# Patient Record
Sex: FEMALE | Race: BLACK OR AFRICAN AMERICAN | NOT HISPANIC OR LATINO
[De-identification: names, ages, dates, MRNs, and addresses within clinical notes are randomized per-mention and may not be internally consistent; named-entity substitution may affect disease eponyms.]

---

## 2021-03-14 ENCOUNTER — APPOINTMENT (OUTPATIENT)
Dept: OBGYN | Facility: CLINIC | Age: 46
End: 2021-03-14
Payer: MEDICARE

## 2021-03-14 VITALS
BODY MASS INDEX: 32.65 KG/M2 | TEMPERATURE: 96.3 F | DIASTOLIC BLOOD PRESSURE: 81 MMHG | WEIGHT: 196 LBS | SYSTOLIC BLOOD PRESSURE: 112 MMHG | HEIGHT: 65 IN | HEART RATE: 67 BPM

## 2021-03-14 DIAGNOSIS — Z11.3 ENCOUNTER FOR SCREENING FOR INFECTIONS WITH A PREDOMINANTLY SEXUAL MODE OF TRANSMISSION: ICD-10-CM

## 2021-03-14 DIAGNOSIS — Z32.01 ENCOUNTER FOR PREGNANCY TEST, RESULT POSITIVE: ICD-10-CM

## 2021-03-14 PROBLEM — Z00.00 ENCOUNTER FOR PREVENTIVE HEALTH EXAMINATION: Status: ACTIVE | Noted: 2021-03-14

## 2021-03-14 PROCEDURE — 99072 ADDL SUPL MATRL&STAF TM PHE: CPT

## 2021-03-14 PROCEDURE — 99386 PREV VISIT NEW AGE 40-64: CPT
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
## 2021-03-14 NOTE — COUNSELING
[Nutrition/ Exercise/ Weight Management] : nutrition, exercise, weight management [Body Image] : body image [Vitamins/Supplements] : vitamins/supplements [Breast Self Exam] : breast self exam [Pregnancy Options] : pregnancy options [STD (testing, results, tx)] : STD (testing, results, tx) [Vaccines] : vaccines

## 2021-03-14 NOTE — PLAN
[FreeTextEntry1] : NAY is a 45 year year old  presenting to establish gyn care and for well woman exam. Sexually active, monogamous with 1 male partner. \par \par HCM\par pap/hpv\par mammo after pregnancy resolved\par sti screening\par urine culture for dysuria\par \par +UPT in office\par approx 6 wks by LMP\par bedside sono shows SLIIUP w/ +FHR\par genetic carrier screening today\par \par rto for dating sono to confirm prengnacy and ob labs\par

## 2021-03-15 LAB
ABO + RH PNL BLD: NORMAL
BLD GP AB SCN SERPL QL: NORMAL
C TRACH RRNA SPEC QL NAA+PROBE: NOT DETECTED
HBV SURFACE AG SER QL: NONREACTIVE
HCG SERPL-MCNC: ABNORMAL MIU/ML
HCV AB SER QL: NONREACTIVE
HCV S/CO RATIO: 0.09 S/CO
HIV1+2 AB SPEC QL IA.RAPID: NONREACTIVE
N GONORRHOEA RRNA SPEC QL NAA+PROBE: NOT DETECTED
SOURCE AMPLIFICATION: NORMAL
T PALLIDUM AB SER QL IA: NEGATIVE

## 2021-03-17 LAB — BACTERIA UR CULT: NORMAL

## 2021-03-18 LAB — CYTOLOGY CVX/VAG DOC THIN PREP: ABNORMAL

## 2021-03-23 LAB — HPV HIGH+LOW RISK DNA PNL CVX: NOT DETECTED

## 2021-04-09 ENCOUNTER — NON-APPOINTMENT (OUTPATIENT)
Age: 46
End: 2021-04-09

## 2021-04-09 ENCOUNTER — APPOINTMENT (OUTPATIENT)
Dept: OBGYN | Facility: CLINIC | Age: 46
End: 2021-04-09
Payer: MEDICARE

## 2021-04-09 ENCOUNTER — ASOB RESULT (OUTPATIENT)
Age: 46
End: 2021-04-09

## 2021-04-09 VITALS
WEIGHT: 199 LBS | HEIGHT: 65 IN | DIASTOLIC BLOOD PRESSURE: 83 MMHG | TEMPERATURE: 96.3 F | SYSTOLIC BLOOD PRESSURE: 117 MMHG | HEART RATE: 68 BPM | BODY MASS INDEX: 33.15 KG/M2

## 2021-04-09 VITALS
DIASTOLIC BLOOD PRESSURE: 83 MMHG | WEIGHT: 199 LBS | SYSTOLIC BLOOD PRESSURE: 117 MMHG | HEIGHT: 65 IN | BODY MASS INDEX: 33.15 KG/M2

## 2021-04-09 PROCEDURE — 76817 TRANSVAGINAL US OBSTETRIC: CPT

## 2021-04-09 PROCEDURE — 99072 ADDL SUPL MATRL&STAF TM PHE: CPT

## 2021-04-09 RX ORDER — DOXYLAMINE SUCCINATE AND PYRIDOXINE HYDROCHLORIDE 20; 20 MG/1; MG/1
20-20 TABLET, EXTENDED RELEASE ORAL
Qty: 14 | Refills: 2 | Status: DISCONTINUED | COMMUNITY
Start: 2021-03-14 | End: 2021-04-09

## 2021-04-12 LAB
B19V IGG SER QL IA: 3.3 INDEX
B19V IGG+IGM SER-IMP: NORMAL
B19V IGG+IGM SER-IMP: POSITIVE
B19V IGM FLD-ACNC: 0.18 INDEX
B19V IGM SER-ACNC: NEGATIVE
BASOPHILS # BLD AUTO: 0.03 K/UL
BASOPHILS NFR BLD AUTO: 0.4 %
CMV IGG SERPL QL: 4.3 U/ML
CMV IGG SERPL-IMP: POSITIVE
CMV IGM SERPL QL: <8 AU/ML
CMV IGM SERPL QL: NEGATIVE
EOSINOPHIL # BLD AUTO: 0.07 K/UL
EOSINOPHIL NFR BLD AUTO: 1 %
HCT VFR BLD CALC: 33.6 %
HGB BLD-MCNC: 10.1 G/DL
IMM GRANULOCYTES NFR BLD AUTO: 0.4 %
LEAD BLD-MCNC: <1 UG/DL
LYMPHOCYTES # BLD AUTO: 1.91 K/UL
LYMPHOCYTES NFR BLD AUTO: 27.9 %
MAN DIFF?: NORMAL
MCHC RBC-ENTMCNC: 24.7 PG
MCHC RBC-ENTMCNC: 30.1 GM/DL
MCV RBC AUTO: 82.2 FL
MEV IGG FLD QL IA: 241 AU/ML
MEV IGG+IGM SER-IMP: POSITIVE
MONOCYTES # BLD AUTO: 0.6 K/UL
MONOCYTES NFR BLD AUTO: 8.8 %
NEUTROPHILS # BLD AUTO: 4.2 K/UL
NEUTROPHILS NFR BLD AUTO: 61.5 %
PLATELET # BLD AUTO: 327 K/UL
RBC # BLD: 4.09 M/UL
RBC # FLD: 19.3 %
RUBV IGG FLD-ACNC: 3.4 INDEX
RUBV IGG SER-IMP: POSITIVE
TSH SERPL-ACNC: 2.85 UIU/ML
VZV AB TITR SER: POSITIVE
VZV IGG SER IF-ACNC: 649.3 INDEX
WBC # FLD AUTO: 6.84 K/UL

## 2021-04-13 LAB
ESTIMATED AVERAGE GLUCOSE: 123 MG/DL
HBA1C MFR BLD HPLC: 5.9 %
MEV IGM SER QL: <0.91 ISR

## 2021-04-21 ENCOUNTER — ASOB RESULT (OUTPATIENT)
Age: 46
End: 2021-04-21

## 2021-04-21 ENCOUNTER — APPOINTMENT (OUTPATIENT)
Dept: ANTEPARTUM | Facility: CLINIC | Age: 46
End: 2021-04-21
Payer: MEDICARE

## 2021-04-21 PROCEDURE — 99072 ADDL SUPL MATRL&STAF TM PHE: CPT

## 2021-04-21 PROCEDURE — 76813 OB US NUCHAL MEAS 1 GEST: CPT

## 2021-04-21 PROCEDURE — 36416 COLLJ CAPILLARY BLOOD SPEC: CPT

## 2021-04-26 LAB
1ST TRIMESTER DATA: NORMAL
ADDENDUM DOC: NORMAL
AFP PNL SERPL: NORMAL
AFP SERPL-ACNC: NORMAL
CLINICAL BIOCHEMIST REVIEW: NORMAL
FREE BETA HCG 1ST TRIMESTER: NORMAL
Lab: NORMAL
NASAL BONE: PRESENT
NOTES NTD: NORMAL
NT: NORMAL
PAPP-A SERPL-ACNC: NORMAL
TRISOMY 18/3: NORMAL

## 2021-04-27 ENCOUNTER — NON-APPOINTMENT (OUTPATIENT)
Age: 46
End: 2021-04-27

## 2021-04-28 ENCOUNTER — APPOINTMENT (OUTPATIENT)
Dept: OBGYN | Facility: CLINIC | Age: 46
End: 2021-04-28
Payer: MEDICARE

## 2021-04-28 VITALS
HEIGHT: 65 IN | SYSTOLIC BLOOD PRESSURE: 121 MMHG | WEIGHT: 199 LBS | DIASTOLIC BLOOD PRESSURE: 88 MMHG | BODY MASS INDEX: 33.15 KG/M2

## 2021-04-28 DIAGNOSIS — N89.8 OTHER SPECIFIED NONINFLAMMATORY DISORDERS OF VAGINA: ICD-10-CM

## 2021-04-28 LAB
HCG UR QL: POSITIVE
QUALITY CONTROL: YES

## 2021-04-28 PROCEDURE — 0502F SUBSEQUENT PRENATAL CARE: CPT | Mod: 25

## 2021-04-28 PROCEDURE — 99213 OFFICE O/P EST LOW 20 MIN: CPT

## 2021-04-28 RX ORDER — ELASTIC BANDAGE 2"X2.2YD
BANDAGE TOPICAL
Refills: 0 | Status: ACTIVE | COMMUNITY

## 2021-04-30 ENCOUNTER — NON-APPOINTMENT (OUTPATIENT)
Age: 46
End: 2021-04-30

## 2021-05-03 DIAGNOSIS — B96.89 ACUTE VAGINITIS: ICD-10-CM

## 2021-05-03 DIAGNOSIS — R73.09 OTHER ABNORMAL GLUCOSE: ICD-10-CM

## 2021-05-03 DIAGNOSIS — N76.0 ACUTE VAGINITIS: ICD-10-CM

## 2021-05-03 LAB
CANDIDA VAG CYTO: NOT DETECTED
G VAGINALIS+PREV SP MTYP VAG QL MICRO: DETECTED
GLUCOSE 1H P 50 G GLC PO SERPL-MCNC: 151 MG/DL
T VAGINALIS VAG QL WET PREP: NOT DETECTED

## 2021-05-10 ENCOUNTER — NON-APPOINTMENT (OUTPATIENT)
Age: 46
End: 2021-05-10

## 2021-05-10 ENCOUNTER — APPOINTMENT (OUTPATIENT)
Dept: OBGYN | Facility: CLINIC | Age: 46
End: 2021-05-10
Payer: MEDICARE

## 2021-05-10 ENCOUNTER — LABORATORY RESULT (OUTPATIENT)
Age: 46
End: 2021-05-10

## 2021-05-10 VITALS
HEIGHT: 65 IN | BODY MASS INDEX: 33.82 KG/M2 | SYSTOLIC BLOOD PRESSURE: 120 MMHG | WEIGHT: 203 LBS | DIASTOLIC BLOOD PRESSURE: 80 MMHG

## 2021-05-10 LAB
GLUCOSE 1H P 100 G GLC PO SERPL-MCNC: 171 MG/DL
GLUCOSE 2H P CHAL SERPL-MCNC: 200 MG/DL
GLUCOSE 3H P CHAL SERPL-MCNC: 203 MG/DL
GLUCOSE BS SERPL-MCNC: 83 MG/DL

## 2021-05-10 PROCEDURE — 0502F SUBSEQUENT PRENATAL CARE: CPT

## 2021-05-12 ENCOUNTER — INPATIENT (INPATIENT)
Facility: HOSPITAL | Age: 46
LOS: 2 days | Discharge: HOME | End: 2021-05-15
Attending: STUDENT IN AN ORGANIZED HEALTH CARE EDUCATION/TRAINING PROGRAM | Admitting: STUDENT IN AN ORGANIZED HEALTH CARE EDUCATION/TRAINING PROGRAM
Payer: MEDICARE

## 2021-05-12 ENCOUNTER — NON-APPOINTMENT (OUTPATIENT)
Age: 46
End: 2021-05-12

## 2021-05-12 VITALS
OXYGEN SATURATION: 100 % | HEART RATE: 84 BPM | DIASTOLIC BLOOD PRESSURE: 75 MMHG | RESPIRATION RATE: 16 BRPM | TEMPERATURE: 99 F | SYSTOLIC BLOOD PRESSURE: 130 MMHG

## 2021-05-12 NOTE — ED ADULT TRIAGE NOTE - CHIEF COMPLAINT QUOTE
Pt arrives from L&D,  approx 15 weeks pregnant c/o palpitations with dizziness, chills since 2:30pm. Reports symptoms has subsided. Denies chest pain. Pt c/o abd cramping and lower back pain. Denies SOB, cough. EKG in progress.

## 2021-05-13 DIAGNOSIS — N12 TUBULO-INTERSTITIAL NEPHRITIS, NOT SPECIFIED AS ACUTE OR CHRONIC: ICD-10-CM

## 2021-05-13 LAB
ALBUMIN SERPL ELPH-MCNC: 3.9 G/DL — SIGNIFICANT CHANGE UP (ref 3.3–5)
ALP SERPL-CCNC: 63 U/L — SIGNIFICANT CHANGE UP (ref 40–120)
ALT FLD-CCNC: 12 U/L — SIGNIFICANT CHANGE UP (ref 4–33)
ANION GAP SERPL CALC-SCNC: 11 MMOL/L — SIGNIFICANT CHANGE UP (ref 7–14)
APPEARANCE UR: CLEAR — SIGNIFICANT CHANGE UP
AST SERPL-CCNC: 20 U/L — SIGNIFICANT CHANGE UP (ref 4–32)
BACTERIA # UR AUTO: NEGATIVE — SIGNIFICANT CHANGE UP
BASOPHILS # BLD AUTO: 0.03 K/UL — SIGNIFICANT CHANGE UP (ref 0–0.2)
BASOPHILS NFR BLD AUTO: 0.3 % — SIGNIFICANT CHANGE UP (ref 0–2)
BILIRUB SERPL-MCNC: 0.2 MG/DL — SIGNIFICANT CHANGE UP (ref 0.2–1.2)
BILIRUB UR-MCNC: NEGATIVE — SIGNIFICANT CHANGE UP
BLD GP AB SCN SERPL QL: NEGATIVE — SIGNIFICANT CHANGE UP
BUN SERPL-MCNC: 13 MG/DL — SIGNIFICANT CHANGE UP (ref 7–23)
CALCIUM SERPL-MCNC: 10.2 MG/DL — SIGNIFICANT CHANGE UP (ref 8.4–10.5)
CHLORIDE SERPL-SCNC: 103 MMOL/L — SIGNIFICANT CHANGE UP (ref 98–107)
CO2 SERPL-SCNC: 19 MMOL/L — LOW (ref 22–31)
COLOR SPEC: YELLOW — SIGNIFICANT CHANGE UP
CREAT SERPL-MCNC: 0.59 MG/DL — SIGNIFICANT CHANGE UP (ref 0.5–1.3)
CULTURE RESULTS: SIGNIFICANT CHANGE UP
DIFF PNL FLD: NEGATIVE — SIGNIFICANT CHANGE UP
EOSINOPHIL # BLD AUTO: 0.07 K/UL — SIGNIFICANT CHANGE UP (ref 0–0.5)
EOSINOPHIL NFR BLD AUTO: 0.7 % — SIGNIFICANT CHANGE UP (ref 0–6)
GLUCOSE SERPL-MCNC: 88 MG/DL — SIGNIFICANT CHANGE UP (ref 70–99)
GLUCOSE UR QL: NEGATIVE — SIGNIFICANT CHANGE UP
HCT VFR BLD CALC: 33 % — LOW (ref 34.5–45)
HGB BLD-MCNC: 10.4 G/DL — LOW (ref 11.5–15.5)
IANC: 7.22 K/UL — SIGNIFICANT CHANGE UP (ref 1.5–8.5)
IMM GRANULOCYTES NFR BLD AUTO: 1.2 % — SIGNIFICANT CHANGE UP (ref 0–1.5)
KETONES UR-MCNC: ABNORMAL
LEUKOCYTE ESTERASE UR-ACNC: NEGATIVE — SIGNIFICANT CHANGE UP
LYMPHOCYTES # BLD AUTO: 19.8 % — SIGNIFICANT CHANGE UP (ref 13–44)
LYMPHOCYTES # BLD AUTO: 2.01 K/UL — SIGNIFICANT CHANGE UP (ref 1–3.3)
MCHC RBC-ENTMCNC: 26.3 PG — LOW (ref 27–34)
MCHC RBC-ENTMCNC: 31.5 GM/DL — LOW (ref 32–36)
MCV RBC AUTO: 83.3 FL — SIGNIFICANT CHANGE UP (ref 80–100)
MONOCYTES # BLD AUTO: 0.7 K/UL — SIGNIFICANT CHANGE UP (ref 0–0.9)
MONOCYTES NFR BLD AUTO: 6.9 % — SIGNIFICANT CHANGE UP (ref 2–14)
NEUTROPHILS # BLD AUTO: 7.22 K/UL — SIGNIFICANT CHANGE UP (ref 1.8–7.4)
NEUTROPHILS NFR BLD AUTO: 71.1 % — SIGNIFICANT CHANGE UP (ref 43–77)
NITRITE UR-MCNC: NEGATIVE — SIGNIFICANT CHANGE UP
NRBC # BLD: 0 /100 WBCS — SIGNIFICANT CHANGE UP
NRBC # FLD: 0 K/UL — SIGNIFICANT CHANGE UP
PH UR: 6.5 — SIGNIFICANT CHANGE UP (ref 5–8)
PLATELET # BLD AUTO: 326 K/UL — SIGNIFICANT CHANGE UP (ref 150–400)
POTASSIUM SERPL-MCNC: 3.4 MMOL/L — LOW (ref 3.5–5.3)
POTASSIUM SERPL-SCNC: 3.4 MMOL/L — LOW (ref 3.5–5.3)
PROT SERPL-MCNC: 7.1 G/DL — SIGNIFICANT CHANGE UP (ref 6–8.3)
PROT UR-MCNC: NEGATIVE — SIGNIFICANT CHANGE UP
RBC # BLD: 3.96 M/UL — SIGNIFICANT CHANGE UP (ref 3.8–5.2)
RBC # FLD: 19.1 % — HIGH (ref 10.3–14.5)
RBC CASTS # UR COMP ASSIST: 2 /HPF — SIGNIFICANT CHANGE UP (ref 0–4)
RH IG SCN BLD-IMP: POSITIVE — SIGNIFICANT CHANGE UP
RH IG SCN BLD-IMP: POSITIVE — SIGNIFICANT CHANGE UP
SARS-COV-2 RNA SPEC QL NAA+PROBE: SIGNIFICANT CHANGE UP
SODIUM SERPL-SCNC: 133 MMOL/L — LOW (ref 135–145)
SP GR SPEC: 1.02 — SIGNIFICANT CHANGE UP (ref 1.01–1.02)
SPECIMEN SOURCE: SIGNIFICANT CHANGE UP
TSH SERPL-MCNC: 3.43 UIU/ML — SIGNIFICANT CHANGE UP (ref 0.27–4.2)
UROBILINOGEN FLD QL: SIGNIFICANT CHANGE UP
WBC # BLD: 10.15 K/UL — SIGNIFICANT CHANGE UP (ref 3.8–10.5)
WBC # FLD AUTO: 10.15 K/UL — SIGNIFICANT CHANGE UP (ref 3.8–10.5)
WBC UR QL: 2 /HPF — SIGNIFICANT CHANGE UP (ref 0–5)

## 2021-05-13 PROCEDURE — 76770 US EXAM ABDO BACK WALL COMP: CPT | Mod: 26

## 2021-05-13 PROCEDURE — 76805 OB US >/= 14 WKS SNGL FETUS: CPT | Mod: 26

## 2021-05-13 PROCEDURE — 99222 1ST HOSP IP/OBS MODERATE 55: CPT | Mod: GC

## 2021-05-13 RX ORDER — ACETAMINOPHEN 500 MG
650 TABLET ORAL ONCE
Refills: 0 | Status: COMPLETED | OUTPATIENT
Start: 2021-05-13 | End: 2021-05-13

## 2021-05-13 RX ORDER — FOLIC ACID 0.8 MG
1 TABLET ORAL DAILY
Refills: 0 | Status: DISCONTINUED | OUTPATIENT
Start: 2021-05-13 | End: 2021-05-14

## 2021-05-13 RX ORDER — HEPARIN SODIUM 5000 [USP'U]/ML
5000 INJECTION INTRAVENOUS; SUBCUTANEOUS EVERY 12 HOURS
Refills: 0 | Status: DISCONTINUED | OUTPATIENT
Start: 2021-05-13 | End: 2021-05-15

## 2021-05-13 RX ORDER — CEFTRIAXONE 500 MG/1
1000 INJECTION, POWDER, FOR SOLUTION INTRAMUSCULAR; INTRAVENOUS EVERY 24 HOURS
Refills: 0 | Status: DISCONTINUED | OUTPATIENT
Start: 2021-05-14 | End: 2021-05-14

## 2021-05-13 RX ORDER — ONDANSETRON 8 MG/1
4 TABLET, FILM COATED ORAL ONCE
Refills: 0 | Status: COMPLETED | OUTPATIENT
Start: 2021-05-13 | End: 2021-05-13

## 2021-05-13 RX ORDER — ACETAMINOPHEN 500 MG
975 TABLET ORAL EVERY 6 HOURS
Refills: 0 | Status: DISCONTINUED | OUTPATIENT
Start: 2021-05-13 | End: 2021-05-15

## 2021-05-13 RX ORDER — SODIUM CHLORIDE 9 MG/ML
2000 INJECTION INTRAMUSCULAR; INTRAVENOUS; SUBCUTANEOUS ONCE
Refills: 0 | Status: COMPLETED | OUTPATIENT
Start: 2021-05-13 | End: 2021-05-13

## 2021-05-13 RX ORDER — SODIUM CHLORIDE 9 MG/ML
1000 INJECTION, SOLUTION INTRAVENOUS
Refills: 0 | Status: COMPLETED | OUTPATIENT
Start: 2021-05-13 | End: 2021-05-13

## 2021-05-13 RX ORDER — ACETAMINOPHEN 500 MG
1000 TABLET ORAL ONCE
Refills: 0 | Status: COMPLETED | OUTPATIENT
Start: 2021-05-13 | End: 2021-05-13

## 2021-05-13 RX ORDER — CEFTRIAXONE 500 MG/1
1000 INJECTION, POWDER, FOR SOLUTION INTRAMUSCULAR; INTRAVENOUS ONCE
Refills: 0 | Status: COMPLETED | OUTPATIENT
Start: 2021-05-13 | End: 2021-05-13

## 2021-05-13 RX ADMIN — SODIUM CHLORIDE 125 MILLILITER(S): 9 INJECTION, SOLUTION INTRAVENOUS at 12:46

## 2021-05-13 RX ADMIN — SODIUM CHLORIDE 2000 MILLILITER(S): 9 INJECTION INTRAMUSCULAR; INTRAVENOUS; SUBCUTANEOUS at 01:01

## 2021-05-13 RX ADMIN — ONDANSETRON 4 MILLIGRAM(S): 8 TABLET, FILM COATED ORAL at 05:53

## 2021-05-13 RX ADMIN — Medication 1 MILLIGRAM(S): at 12:46

## 2021-05-13 RX ADMIN — Medication 400 MILLIGRAM(S): at 07:02

## 2021-05-13 RX ADMIN — Medication 650 MILLIGRAM(S): at 01:01

## 2021-05-13 RX ADMIN — ONDANSETRON 4 MILLIGRAM(S): 8 TABLET, FILM COATED ORAL at 01:01

## 2021-05-13 RX ADMIN — CEFTRIAXONE 100 MILLIGRAM(S): 500 INJECTION, POWDER, FOR SOLUTION INTRAMUSCULAR; INTRAVENOUS at 00:30

## 2021-05-13 RX ADMIN — ONDANSETRON 4 MILLIGRAM(S): 8 TABLET, FILM COATED ORAL at 19:57

## 2021-05-13 RX ADMIN — HEPARIN SODIUM 5000 UNIT(S): 5000 INJECTION INTRAVENOUS; SUBCUTANEOUS at 17:57

## 2021-05-13 RX ADMIN — Medication 1 TABLET(S): at 12:46

## 2021-05-13 NOTE — H&P ADULT - NSHPLABSRESULTS_GEN_ALL_CORE
LABS:                        10.4   10.15 )-----------( 326      ( 13 May 2021 00:37 )             33.0     05-13    133<L>  |  103  |  13  ----------------------------<  88  3.4<L>   |  19<L>  |  0.59    Ca    10.2      13 May 2021 00:37    TPro  7.1  /  Alb  3.9  /  TBili  0.2  /  DBili  x   /  AST  20  /  ALT  12  /  AlkPhos  63  05-13      Urinalysis Basic - ( 13 May 2021 00:43 )    Color: Yellow / Appearance: Clear / S.023 / pH: x  Gluc: x / Ketone: Small  / Bili: Negative / Urobili: <2 mg/dL   Blood: x / Protein: Negative / Nitrite: Negative   Leuk Esterase: Negative / RBC: 2 /HPF / WBC 2 /HPF   Sq Epi: x / Non Sq Epi: x / Bacteria: Negative        Blood Type: O Positive      RADIOLOGY & ADDITIONAL STUDIES:

## 2021-05-13 NOTE — ED ADULT NURSE NOTE - OBJECTIVE STATEMENT
Patient received to intake room 3, A&OX4, ambulatory, c/o palpitations. Approx 15 weeks pregnant, states she had intermittent palpitations earlier today. Also endorses chills/lower back pain. Denies palpitations at this time. Denies CP/SOB. 18G IV placed to R AC, labs drawn and sent. Medicated as per orders.

## 2021-05-13 NOTE — ED CDU PROVIDER DISPOSITION NOTE - CLINICAL COURSE
Patient clinically w/ pyelonephritis, failed outpatient course of Macrobid, admitted to GYN service for further mgmt.

## 2021-05-13 NOTE — PROGRESS NOTE ADULT - ASSESSMENT
Assessment/Plan: 46y  @16w presenting w/chills and flank pain in setting of abx treatment for UTI     Neuro: PO pain meds   CV: Hemodynamically stable  Pulm: Saturating well on RA. Increase incentive spirometry, out of bed, and ambulation as tolerated.  GI: tolerating PO, Zofran for nausea  : Renal US wnl, plan tbd w/attending  Heme: Increase OOB and ambulation.     EDER Lion, PGY1

## 2021-05-13 NOTE — PROVIDER CONTACT NOTE (OTHER) - ACTION/TREATMENT ORDERED:
Will place order for zofran once sign out is completed.
Will change order as soon as a computer available to modify order

## 2021-05-13 NOTE — ED CDU PROVIDER INITIAL DAY NOTE - PROGRESS NOTE DETAILS
mitzi:   pt pregnant 14 weeks, desired pregnancy, with pos urine culture pan sensitive, given macrobid, but presented feeling unwell with chills and with back pain, started on iv abx, gyn involved.   pt awake, alert, looking uncomfortable.  pt with back pain on the right- muscle spasm? vs cva tenderenss.  lungs are clear. ua in the ed was neg but already was on macrobid for more than a week.  pt likely to need abx for inpatient admission.  awaiting GYN plan on this.  giving heat packs and tylenol as well.

## 2021-05-13 NOTE — PROGRESS NOTE ADULT - SUBJECTIVE AND OBJECTIVE BOX
Gyn Progress Note     Subjective:     Pt is a 47yo  @16w being monitored for presumed pyelonephritis after presenting to the ED w/chills and flank pain while on abx for UTI. Patient seen and examined at bedside. This AM patient complains of 8/10 R flank pain which she says is improved from yesterday; nausea but no vomiting, and chills. Denies dysuria or urgency.     Objective:  T(F): 98.1 (21 @ 05:50), Max: 98.7 (21 @ 22:32)  HR: 69 (21 @ 05:50) (69 - 84)  BP: 121/78 (21 @ 05:50) (105/61 - 130/75)  RR: 18 (21 @ 05:50) (16 - 18)  SpO2: 100% (21 @ 05:50) (100% - 100%)  Wt(kg): --  I&O's Summary    POCT Blood Glucose.: 88 mg/dL (12 May 2021 22:37)      Physical Exam:  Constitutional: NAD, A+O x3  CV: RRR  Lungs: clear to auscultation bilaterally  Abdomen: soft, gravid, nontender  Back: CTA tenderness R side    Labs:                        10.4   10.15 )-----------( 326      ( 13 May 2021 00:37 )             33.0   baso 0.3    eos 0.7    imm gran 1.2    lymph 19.8   mono 6.9    poly 71.1         133<L>  |  103    |  13     ----------------------------<  88     3.4<L>   |  19<L>  |  0.59     Ca    10.2       13 May 2021 00:37    TPro  7.1    /  Alb  3.9    /  TBili  0.2    /  DBili  x      /  AST  20     /  ALT  12     /  AlkPhos  63     05-          Urinalysis Basic - ( 13 May 2021 00:43 )    Color: Yellow / Appearance: Clear / S.023 / pH: x  Gluc: x / Ketone: Small  / Bili: Negative / Urobili: <2 mg/dL   Blood: x / Protein: Negative / Nitrite: Negative   Leuk Esterase: Negative / RBC: 2 /HPF / WBC 2 /HPF   Sq Epi: x / Non Sq Epi: x / Bacteria: Negative

## 2021-05-13 NOTE — H&P ADULT - PROBLEM SELECTOR PLAN 1
-admit to OB due clinical presentation concerning for pyelonephritis  -monitor VS  -continue with IVF LR@125  -daily FH check  -continue IV ceftriaxone 1g 24 hours  -f/u blood and urine Cx  -Renal sono without evidence of stone    Discussed with Dr. Annalisa Dao MD PGY4

## 2021-05-13 NOTE — ED ADULT NURSE NOTE - ED STAT RN HANDOFF DETAILS
Patient is A&Ox4, aware of plan of care, and has room available.  Report faxed to assigned floor with confirmation received.  Patient awaiting transportation.  Will continue to monitor until transport.

## 2021-05-13 NOTE — ED PROVIDER NOTE - PHYSICAL EXAMINATION
Gen: adult F, mild distress  Head: NC/AT  Neck: trachea midline  Resp:  No distress  Ext: no deformities  Neuro:  A&Ox4 appears non focal  Skin:  Warm and dry as visualized  Psych:  Normal affect and mood  Abd:  s/nd, +R>L CVA TTP

## 2021-05-13 NOTE — H&P ADULT - ASSESSMENT
46y  LMP 21 @16.1 wk w/ UCx confirmed pan-sensitive E. Coli UTI () on Macrobid day 9/10 presenting with flank pain and chills at home x1 day.  Labs wnl, WBC 10.15, UA clean.  Patient afebrile with vital signs wnl.  Physical exam w/ CVA tenderness.  Bedside doppler .  Patient received IV Ceftriaxone and IVF in ED as well as tylenol for pain which provided incomplete relief. HPI: 46y  CHRISTO 10/27/21 @16.1wk (determined by 13 wk US inconsistent with LMP) with confirmed pan-sensitive E. Coli UTI () on Macrobid day 9/10 presenting with flank pain and chills at home x1 day.  Labs wnl, WBC 10.15, UA clean.  Patient afebrile with vital signs wnl.  Physical exam w/ CVA tenderness.  Bedside doppler .  Patient received IV Ceftriaxone and IVF in ED as well as tylenol for pain which provided incomplete relief.

## 2021-05-13 NOTE — H&P ADULT - HISTORY OF PRESENT ILLNESS
HPI: 46y  LMP 21 @16wk presented to ED with chills and flank pain at home since 2:30pm.  Patient states Flank pain started at 2:30p, stopped at 5:30p and then restarted at 6pm with chills which prompted her to come to ED.  Associated nausea however no vomiting and tolerating PO.  Patient was on day 9/10 of Macrobid course for pan-sensitive E. Coli UTI diagnosed on UCx .  At that time she had urinary urgency and dysuria which resolved 5 days into antibiotic course.  Denies fever at home, headache, chest pain, SOB, vaginal bleeding, vaginal discharge. Patient initially sent to CDU for renal ultrasound and to monitor for improvement of pain. Pain improved by not resolved.    PNC:  UTI ()    OB/GYN HISTORY:   eTOP x2 (2004 & )  pLTCS 10# ()  known fibroids s/p myomectomy, known ovarian cyst    Name of GYN Physician: Dr. Reis HPI: 46y  CHRISTO 10/27/21 @16.1wk (determined by 13 wk US inconsistent with LMP) presented to ED with chills and flank pain at home since 2:30pm.  Patient states Flank pain started at 2:30p, stopped at 5:30p and then restarted at 6pm with chills which prompted her to come to ED.  Associated nausea however no vomiting and tolerating PO.  Patient was on day 9/10 of Macrobid course for pan-sensitive E. Coli UTI diagnosed on UCx .  At that time she had urinary urgency and dysuria which resolved 5 days into antibiotic course.  Denies fever at home, headache, chest pain, SOB, vaginal bleeding, vaginal discharge. Patient initially sent to CDU for renal ultrasound and to monitor for improvement of pain. Pain improved by not resolved.    PNC:  UTI ()    OB/GYN HISTORY:   eTOP x2 ( & )  pLTCS 10# ()  known fibroids s/p myomectomy, known ovarian cyst    Name of GYN Physician: Dr. Reis

## 2021-05-13 NOTE — ED PROVIDER NOTE - OBJECTIVE STATEMENT
45 yo F, c/o dysuria, flank pain.  Duration: dysuria 1wk.    Context:  15wk pregnant by last US performed 4/22/21.  Known IUP.  Had Pan-sensitive E.Coli UTI identified in clinic, and Tx'd with nitrofurantoin, but still with dysuria.  Now has flank pain & body aches.   Primary OB/GYN:  Dr. Kirill Reis.  UCx performed on 5/1 showed a pan-sensitive E.Coli  Associated Sx:  body aches, bilateral flank pain (R>L).  Reported a single episode of palpitations, now resolved.

## 2021-05-13 NOTE — ED PROVIDER NOTE - CLINICAL SUMMARY MEDICAL DECISION MAKING FREE TEXT BOX
Impression:  H&P c/w pregnant pyelonephritis, failing outpatient PO abx.   Blood type O-positive.  No VB.    Plan:  UA/UCx, BCx, abx, IVF, will d/w OB/GYN

## 2021-05-13 NOTE — ED CDU PROVIDER DISPOSITION NOTE - ATTENDING CONTRIBUTION TO CARE
mitzi:   pt pregnant 14 weeks, desired pregnancy, with pos urine culture pan sensitive, given macrobid, but presented feeling unwell with chills and with back pain, started on iv abx, gyn involved.   pt awake, alert, looking uncomfortable.  pt with back pain on the right- muscle spasm? vs cva tenderenss.  lungs are clear. ua in the ed was neg but already was on macrobid for more than a week.  pt likely to need abx for inpatient admission.  awaiting GYN plan on this.  giving heat packs and tylenol as well.    I performed a history and physical exam of the patient and discussed their management with the resident and /or advanced care provider. I reviewed the resident and /or ACP's note and agree with the documented findings and plan of care. My medical decison making and observations are found above.    gyn plans admission for iv abx and observation

## 2021-05-13 NOTE — ED CDU PROVIDER INITIAL DAY NOTE - OBJECTIVE STATEMENT
CDU note: patient is a 47 y/o F approx 14 weeks pregnant, previously treated with  nitrofurantoin for a UTI, but still with dysuria/flank pain/body aches. In ED labs did not reveal any gross abnormal values. Patient accepted to CDU for pain control and IV abx. CDU note: patient is a 45 y/o F approx 14 weeks pregnant, previously treated with  nitrofurantoin for a UTI, but still with dysuria/flank pain/body aches. In ED labs did not reveal any gross abnormal values. Patient accepted to CDU for pain control and IV abx..

## 2021-05-13 NOTE — ED CDU PROVIDER INITIAL DAY NOTE - ATTENDING CONTRIBUTION TO CARE
MD Oswald:  I have personally performed a face to face diagnostic evaluation on this patient with the PA.  I have reviewed the ACP note and agree with the history, exam, and plan of care, except as noted.  History and Exam by me shows a 45 yo F, c/o dysuria, flank pain.  Sent to CDU for Abx treatment of pyelonephritis.   Duration: dysuria 1wk.    Context:  15wk pregnant by last US performed 4/22/21.  Known IUP.  Had Pan-sensitive E.Coli UTI identified in clinic, and Tx'd with nitrofurantoin, but still with dysuria.  Now has flank pain & body aches.   Primary OB/GYN:  Dr. Kirill Reis.  UCx performed on 5/1 showed a pan-sensitive E.Coli  Associated Sx:  body aches, bilateral flank pain (R>L).  Reported a single episode of palpitations, now resolved.  Impression:  H&P c/w pregnant pyelonephritis, failing outpatient PO abx.   Even though UA/UCx is negative, this is not surprising, given recent PO abx (nitrofurantoin).   Plan:  IV abx in CDU, OB recs, reassess.

## 2021-05-13 NOTE — PROVIDER CONTACT NOTE (OTHER) - ASSESSMENT
Patient feeling nauseous and had small amount of emesis
Patient ordered for Fetal HR to be auscultated 24 hours. Order needs to be modified to state that antepartum nurse has to check every 24 hours since Surgical nurses are not trained to check FHR

## 2021-05-13 NOTE — ED CDU PROVIDER INITIAL DAY NOTE - MEDICAL DECISION MAKING DETAILS
Impression:  H&P c/w pregnant pyelonephritis, failing outpatient PO abx.   Even though UA/UCx is negative, this is not surprising, given recent PO abx (nitrofurantoin).   Plan:  IV abx in CDU, OB recs, reassess.

## 2021-05-13 NOTE — H&P ADULT - NSHPPHYSICALEXAM_GEN_ALL_CORE
Vital Signs Last 24 Hrs  T(C): 36.6 (13 May 2021 10:41), Max: 37.1 (12 May 2021 22:32)  T(F): 97.9 (13 May 2021 10:41), Max: 98.7 (12 May 2021 22:32)  HR: 78 (13 May 2021 10:41) (69 - 84)  BP: 112/68 (13 May 2021 10:41) (105/61 - 130/75)  BP(mean): --  RR: 15 (13 May 2021 10:41) (15 - 18)  SpO2: 100% (13 May 2021 10:41) (100% - 100%)    PHYSICAL EXAM:   Gen: NAD, alert and oriented x 3  Cardiovascular: regular   Respiratory: breathing comfortably on RA  Abd: soft, non tender, non-distended, CVA tenderness R>L  Extremities: NTBL  Skin: warm and well perfused
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
## 2021-05-13 NOTE — ED PROVIDER NOTE - ATTENDING CONTRIBUTION TO CARE
mitzi:   pt pregnant 14 weeks, desired pregnancy, with pos urine culture pan sensitive, given macrobid, but presented feeling unwell with chills and with back pain, started on iv abx, gyn involved.   pt awake, alert, looking uncomfortable.  pt with back pain on the right- muscle spasm? vs cva tenderenss.  lungs are clear. ua in the ed was neg but already was on macrobid for more than a week.  pt likely to need abx for inpatient admission.  awaiting GYN plan on this.  giving heat packs and tylenol as well.    I performed a history and physical exam of the patient and discussed their management with the resident and /or advanced care provider. I reviewed the resident and /or ACP's note and agree with the documented findings and plan of care. My medical decison making and observations are found above.

## 2021-05-14 ENCOUNTER — ASOB RESULT (OUTPATIENT)
Age: 46
End: 2021-05-14

## 2021-05-14 ENCOUNTER — APPOINTMENT (OUTPATIENT)
Dept: MATERNAL FETAL MEDICINE | Facility: CLINIC | Age: 46
End: 2021-05-14
Payer: MEDICARE

## 2021-05-14 DIAGNOSIS — O23.42 UNSPECIFIED INFECTION OF URINARY TRACT IN PREGNANCY, SECOND TRIMESTER: ICD-10-CM

## 2021-05-14 LAB
BASOPHILS # BLD AUTO: 0.03 K/UL — SIGNIFICANT CHANGE UP (ref 0–0.2)
BASOPHILS NFR BLD AUTO: 0.4 % — SIGNIFICANT CHANGE UP (ref 0–2)
EOSINOPHIL # BLD AUTO: 0.07 K/UL — SIGNIFICANT CHANGE UP (ref 0–0.5)
EOSINOPHIL NFR BLD AUTO: 0.8 % — SIGNIFICANT CHANGE UP (ref 0–6)
HCT VFR BLD CALC: 28.9 % — LOW (ref 34.5–45)
HGB BLD-MCNC: 9.2 G/DL — LOW (ref 11.5–15.5)
IANC: 5.68 K/UL — SIGNIFICANT CHANGE UP (ref 1.5–8.5)
IMM GRANULOCYTES NFR BLD AUTO: 1.7 % — HIGH (ref 0–1.5)
LYMPHOCYTES # BLD AUTO: 1.97 K/UL — SIGNIFICANT CHANGE UP (ref 1–3.3)
LYMPHOCYTES # BLD AUTO: 23.4 % — SIGNIFICANT CHANGE UP (ref 13–44)
MCHC RBC-ENTMCNC: 26.7 PG — LOW (ref 27–34)
MCHC RBC-ENTMCNC: 31.8 GM/DL — LOW (ref 32–36)
MCV RBC AUTO: 83.8 FL — SIGNIFICANT CHANGE UP (ref 80–100)
MONOCYTES # BLD AUTO: 0.52 K/UL — SIGNIFICANT CHANGE UP (ref 0–0.9)
MONOCYTES NFR BLD AUTO: 6.2 % — SIGNIFICANT CHANGE UP (ref 2–14)
NEUTROPHILS # BLD AUTO: 5.68 K/UL — SIGNIFICANT CHANGE UP (ref 1.8–7.4)
NEUTROPHILS NFR BLD AUTO: 67.5 % — SIGNIFICANT CHANGE UP (ref 43–77)
NRBC # BLD: 0 /100 WBCS — SIGNIFICANT CHANGE UP
NRBC # FLD: 0 K/UL — SIGNIFICANT CHANGE UP
PLATELET # BLD AUTO: 308 K/UL — SIGNIFICANT CHANGE UP (ref 150–400)
RBC # BLD: 3.45 M/UL — LOW (ref 3.8–5.2)
RBC # FLD: 19.1 % — HIGH (ref 10.3–14.5)
WBC # BLD: 8.41 K/UL — SIGNIFICANT CHANGE UP (ref 3.8–10.5)
WBC # FLD AUTO: 8.41 K/UL — SIGNIFICANT CHANGE UP (ref 3.8–10.5)

## 2021-05-14 PROCEDURE — 72195 MRI PELVIS W/O DYE: CPT | Mod: 26

## 2021-05-14 PROCEDURE — 99232 SBSQ HOSP IP/OBS MODERATE 35: CPT | Mod: GC

## 2021-05-14 PROCEDURE — 74181 MRI ABDOMEN W/O CONTRAST: CPT | Mod: 26

## 2021-05-14 PROCEDURE — G0108 DIAB MANAGE TRN  PER INDIV: CPT | Mod: 95

## 2021-05-14 RX ORDER — ONDANSETRON 8 MG/1
4 TABLET, FILM COATED ORAL EVERY 6 HOURS
Refills: 0 | Status: DISCONTINUED | OUTPATIENT
Start: 2021-05-14 | End: 2021-05-15

## 2021-05-14 RX ORDER — ACETAMINOPHEN 500 MG
1000 TABLET ORAL ONCE
Refills: 0 | Status: COMPLETED | OUTPATIENT
Start: 2021-05-14 | End: 2021-05-14

## 2021-05-14 RX ORDER — POLYETHYLENE GLYCOL 3350 17 G/17G
17 POWDER, FOR SOLUTION ORAL DAILY
Refills: 0 | Status: DISCONTINUED | OUTPATIENT
Start: 2021-05-14 | End: 2021-05-15

## 2021-05-14 RX ORDER — ONDANSETRON 8 MG/1
4 TABLET, FILM COATED ORAL ONCE
Refills: 0 | Status: DISCONTINUED | OUTPATIENT
Start: 2021-05-14 | End: 2021-05-14

## 2021-05-14 RX ORDER — ONDANSETRON 8 MG/1
4 TABLET, FILM COATED ORAL ONCE
Refills: 0 | Status: COMPLETED | OUTPATIENT
Start: 2021-05-14 | End: 2021-05-14

## 2021-05-14 RX ADMIN — ONDANSETRON 4 MILLIGRAM(S): 8 TABLET, FILM COATED ORAL at 10:22

## 2021-05-14 RX ADMIN — Medication 1 ENEMA: at 21:34

## 2021-05-14 RX ADMIN — CEFTRIAXONE 100 MILLIGRAM(S): 500 INJECTION, POWDER, FOR SOLUTION INTRAMUSCULAR; INTRAVENOUS at 01:08

## 2021-05-14 RX ADMIN — ONDANSETRON 4 MILLIGRAM(S): 8 TABLET, FILM COATED ORAL at 23:35

## 2021-05-14 RX ADMIN — POLYETHYLENE GLYCOL 3350 17 GRAM(S): 17 POWDER, FOR SOLUTION ORAL at 21:33

## 2021-05-14 RX ADMIN — ONDANSETRON 4 MILLIGRAM(S): 8 TABLET, FILM COATED ORAL at 02:02

## 2021-05-14 RX ADMIN — Medication 400 MILLIGRAM(S): at 10:45

## 2021-05-14 RX ADMIN — Medication 80 MILLIGRAM(S): at 22:43

## 2021-05-14 RX ADMIN — HEPARIN SODIUM 5000 UNIT(S): 5000 INJECTION INTRAVENOUS; SUBCUTANEOUS at 06:43

## 2021-05-14 RX ADMIN — HEPARIN SODIUM 5000 UNIT(S): 5000 INJECTION INTRAVENOUS; SUBCUTANEOUS at 19:56

## 2021-05-14 NOTE — PROGRESS NOTE ADULT - ASSESSMENT
A/P: 46y  CHRISTO 10/27/21 @16.2wk (determined by 13 wk US inconsistent with LMP) with confirmed pan-sensitive E. Coli UTI () on Macrobid day 9/10 presenting with flank pain and chills at home x1 day.  Labs wnl, WBC 10.15, UA clean, but PE w/ CVA tenderness.  Bedside doppler . Admitted for presumed pyelo. Saturating well on RA, no respiratory issues.    #Pyelo  - c/w ceftriaxone (-)  - AM CBC  - f/u UCx (); UA clean  - tylenol prn for pain    #FWB  - FHR prn    #MWB  - HSQ   - Reg diet  - maintain active T+S  - COVID neg    H El PGY3 A/P: 46y  CHRISTO 10/27/21 @16.2wk (determined by 13 wk US inconsistent with LMP) with confirmed pan-sensitive E. Coli UTI () on Macrobid day 9/10 presenting with flank pain and chills at home x1 day.  Labs wnl, WBC 10.15, UA clean, but PE w/ CVA tenderness.  Bedside doppler . Admitted for presumed pyelo. Saturating well on RA, no respiratory issues.    #Pyelo  - c/w ceftriaxone (-)  - AM CBC  - f/u UCx, BCx (); UA clean  - Renal sono (): wnl  - tylenol prn for pain    #FWB  - FHR prn    #MWB  - HSQ   - Reg diet  - maintain active T+S  - COVID neg    H El PGY3

## 2021-05-14 NOTE — PROGRESS NOTE ADULT - SUBJECTIVE AND OBJECTIVE BOX
R3 Antepartum Note, HD#2    Interval events: feeling much improved. No more abdominal/flank pain.    Patient seen and examined at bedside, no acute overnight events. No acute complaints. Pt reports +FM, denies LOF, VB, ctx, HA, epigastric pain, blurred vision, CP, SOB, N/V, fevers, and chills.    Vital Signs Last 24 Hours  T(C): 36.8 (05-14-21 @ 06:44), Max: 36.8 (05-13-21 @ 22:20)  HR: 76 (05-14-21 @ 06:44) (72 - 79)  BP: 92/56 (05-14-21 @ 06:44) (92/56 - 112/68)  RR: 17 (05-14-21 @ 06:44) (15 - 18)  SpO2: 100% (05-14-21 @ 06:44) (100% - 100%)    CAPILLARY BLOOD GLUCOSE      POCT Blood Glucose.: 75 mg/dL (13 May 2021 10:03)      Physical Exam:  General: NAD  Abdomen: Soft, non-tender, gravid  Ext: No pain or swelling        Labs:             10.4   10.15 )-----------( 326      ( 05-13 @ 00:37 )             33.0     05-13 @ 00:37    133  |  103  |  13  ----------------------------<  88  3.4   |  19  |  0.59    Ca    10.2      05-13 @ 00:37    TPro  7.1  /  Alb  3.9  /  TBili  0.2  /  DBili  x   /  AST  20  /  ALT  12  /  AlkPhos  63  05-13 @ 00:37            MEDICATIONS  (STANDING):  cefTRIAXone   IVPB 1000 milliGRAM(s) IV Intermittent every 24 hours  folic acid 1 milliGRAM(s) Oral daily  heparin   Injectable 5000 Unit(s) SubCutaneous every 12 hours  prenatal multivitamin 1 Tablet(s) Oral daily    MEDICATIONS  (PRN):  acetaminophen   Tablet .. 975 milliGRAM(s) Oral every 6 hours PRN Mild Pain (1 - 3)   R3 Antepartum Note, HD#2    Interval events: feeling much improved. No more abdominal/flank pain. Afebrile.    Patient seen and examined at bedside, no acute overnight events. No acute complaints. Pt reports +FM, denies LOF, VB, ctx, HA, epigastric pain, blurred vision, CP, SOB, N/V, fevers, and chills.    Vital Signs Last 24 Hours  T(C): 36.8 (05-14-21 @ 06:44), Max: 36.8 (05-13-21 @ 22:20)  HR: 76 (05-14-21 @ 06:44) (72 - 79)  BP: 92/56 (05-14-21 @ 06:44) (92/56 - 112/68)  RR: 17 (05-14-21 @ 06:44) (15 - 18)  SpO2: 100% (05-14-21 @ 06:44) (100% - 100%)    CAPILLARY BLOOD GLUCOSE      POCT Blood Glucose.: 75 mg/dL (13 May 2021 10:03)      Physical Exam:  General: NAD  Abdomen: Soft, non-tender, gravid  Ext: No pain or swelling        Labs:             10.4   10.15 )-----------( 326      ( 05-13 @ 00:37 )             33.0     05-13 @ 00:37    133  |  103  |  13  ----------------------------<  88  3.4   |  19  |  0.59    Ca    10.2      05-13 @ 00:37    TPro  7.1  /  Alb  3.9  /  TBili  0.2  /  DBili  x   /  AST  20  /  ALT  12  /  AlkPhos  63  05-13 @ 00:37            MEDICATIONS  (STANDING):  cefTRIAXone   IVPB 1000 milliGRAM(s) IV Intermittent every 24 hours  folic acid 1 milliGRAM(s) Oral daily  heparin   Injectable 5000 Unit(s) SubCutaneous every 12 hours  prenatal multivitamin 1 Tablet(s) Oral daily    MEDICATIONS  (PRN):  acetaminophen   Tablet .. 975 milliGRAM(s) Oral every 6 hours PRN Mild Pain (1 - 3)

## 2021-05-14 NOTE — CHART NOTE - NSCHARTNOTEFT_GEN_A_CORE
R3  Patient evaluated for abdominal pain. Patient reports pain and nausea immediately after eating. Afebrile. Has not had a bowel movement in 6 days despite miralax.  ICU Vital Signs Last 24 Hrs  T(C): 36.4 (14 May 2021 10:12), Max: 36.8 (13 May 2021 22:20)  T(F): 97.6 (14 May 2021 10:12), Max: 98.3 (14 May 2021 06:44)  HR: 79 (14 May 2021 10:12) (72 - 79)  BP: 105/64 (14 May 2021 10:12) (92/56 - 105/68)  BP(mean): --  ABP: --  ABP(mean): --  RR: 18 (14 May 2021 10:12) (17 - 18)  SpO2: 99% (14 May 2021 10:12) (99% - 100%)    Abd: soft, RLQ TTP new from this AM. No rebound or guarding  VE: 0/30/-3, yeast noted grossly    - will obtain MRI to eval RLQ, r/o appy  - clomitrazole topical for yeast infection  - IV tylenol for pain    H El PGY3  TBD with Dr. Poe

## 2021-05-15 ENCOUNTER — TRANSCRIPTION ENCOUNTER (OUTPATIENT)
Age: 46
End: 2021-05-15

## 2021-05-15 VITALS
RESPIRATION RATE: 16 BRPM | DIASTOLIC BLOOD PRESSURE: 59 MMHG | TEMPERATURE: 99 F | HEART RATE: 87 BPM | OXYGEN SATURATION: 99 % | SYSTOLIC BLOOD PRESSURE: 109 MMHG

## 2021-05-15 LAB
ALBUMIN SERPL ELPH-MCNC: 3.3 G/DL — SIGNIFICANT CHANGE UP (ref 3.3–5)
ALP SERPL-CCNC: 53 U/L — SIGNIFICANT CHANGE UP (ref 40–120)
ALT FLD-CCNC: 11 U/L — SIGNIFICANT CHANGE UP (ref 4–33)
ANION GAP SERPL CALC-SCNC: 11 MMOL/L — SIGNIFICANT CHANGE UP (ref 7–14)
AST SERPL-CCNC: 13 U/L — SIGNIFICANT CHANGE UP (ref 4–32)
BASOPHILS # BLD AUTO: 0.02 K/UL — SIGNIFICANT CHANGE UP (ref 0–0.2)
BASOPHILS NFR BLD AUTO: 0.3 % — SIGNIFICANT CHANGE UP (ref 0–2)
BILIRUB SERPL-MCNC: 0.3 MG/DL — SIGNIFICANT CHANGE UP (ref 0.2–1.2)
BUN SERPL-MCNC: 6 MG/DL — LOW (ref 7–23)
CALCIUM SERPL-MCNC: 8.4 MG/DL — SIGNIFICANT CHANGE UP (ref 8.4–10.5)
CHLORIDE SERPL-SCNC: 106 MMOL/L — SIGNIFICANT CHANGE UP (ref 98–107)
CO2 SERPL-SCNC: 18 MMOL/L — LOW (ref 22–31)
CREAT SERPL-MCNC: 0.56 MG/DL — SIGNIFICANT CHANGE UP (ref 0.5–1.3)
EOSINOPHIL # BLD AUTO: 0.08 K/UL — SIGNIFICANT CHANGE UP (ref 0–0.5)
EOSINOPHIL NFR BLD AUTO: 1.1 % — SIGNIFICANT CHANGE UP (ref 0–6)
GLUCOSE SERPL-MCNC: 74 MG/DL — SIGNIFICANT CHANGE UP (ref 70–99)
HCT VFR BLD CALC: 27.9 % — LOW (ref 34.5–45)
HGB BLD-MCNC: 9 G/DL — LOW (ref 11.5–15.5)
IANC: 4.49 K/UL — SIGNIFICANT CHANGE UP (ref 1.5–8.5)
IMM GRANULOCYTES NFR BLD AUTO: 1 % — SIGNIFICANT CHANGE UP (ref 0–1.5)
LYMPHOCYTES # BLD AUTO: 1.84 K/UL — SIGNIFICANT CHANGE UP (ref 1–3.3)
LYMPHOCYTES # BLD AUTO: 26.1 % — SIGNIFICANT CHANGE UP (ref 13–44)
MCHC RBC-ENTMCNC: 26.9 PG — LOW (ref 27–34)
MCHC RBC-ENTMCNC: 32.3 GM/DL — SIGNIFICANT CHANGE UP (ref 32–36)
MCV RBC AUTO: 83.3 FL — SIGNIFICANT CHANGE UP (ref 80–100)
MONOCYTES # BLD AUTO: 0.56 K/UL — SIGNIFICANT CHANGE UP (ref 0–0.9)
MONOCYTES NFR BLD AUTO: 7.9 % — SIGNIFICANT CHANGE UP (ref 2–14)
NEUTROPHILS # BLD AUTO: 4.49 K/UL — SIGNIFICANT CHANGE UP (ref 1.8–7.4)
NEUTROPHILS NFR BLD AUTO: 63.6 % — SIGNIFICANT CHANGE UP (ref 43–77)
NRBC # BLD: 0 /100 WBCS — SIGNIFICANT CHANGE UP
NRBC # FLD: 0 K/UL — SIGNIFICANT CHANGE UP
PLATELET # BLD AUTO: 309 K/UL — SIGNIFICANT CHANGE UP (ref 150–400)
POTASSIUM SERPL-MCNC: 3.1 MMOL/L — LOW (ref 3.5–5.3)
POTASSIUM SERPL-SCNC: 3.1 MMOL/L — LOW (ref 3.5–5.3)
PROT SERPL-MCNC: 6.1 G/DL — SIGNIFICANT CHANGE UP (ref 6–8.3)
RBC # BLD: 3.35 M/UL — LOW (ref 3.8–5.2)
RBC # FLD: 18.9 % — HIGH (ref 10.3–14.5)
SODIUM SERPL-SCNC: 135 MMOL/L — SIGNIFICANT CHANGE UP (ref 135–145)
WBC # BLD: 7.06 K/UL — SIGNIFICANT CHANGE UP (ref 3.8–10.5)
WBC # FLD AUTO: 7.06 K/UL — SIGNIFICANT CHANGE UP (ref 3.8–10.5)

## 2021-05-15 PROCEDURE — 99232 SBSQ HOSP IP/OBS MODERATE 35: CPT | Mod: GC

## 2021-05-15 RX ORDER — POTASSIUM CHLORIDE 20 MEQ
20 PACKET (EA) ORAL
Refills: 0 | Status: DISCONTINUED | OUTPATIENT
Start: 2021-05-15 | End: 2021-05-15

## 2021-05-15 RX ORDER — POTASSIUM CHLORIDE 20 MEQ
10 PACKET (EA) ORAL ONCE
Refills: 0 | Status: DISCONTINUED | OUTPATIENT
Start: 2021-05-15 | End: 2021-05-15

## 2021-05-15 RX ORDER — POTASSIUM CHLORIDE 20 MEQ
40 PACKET (EA) ORAL ONCE
Refills: 0 | Status: COMPLETED | OUTPATIENT
Start: 2021-05-15 | End: 2021-05-15

## 2021-05-15 RX ORDER — MAGNESIUM SULFATE 500 MG/ML
2 VIAL (ML) INJECTION ONCE
Refills: 0 | Status: COMPLETED | OUTPATIENT
Start: 2021-05-15 | End: 2021-05-15

## 2021-05-15 RX ADMIN — ONDANSETRON 4 MILLIGRAM(S): 8 TABLET, FILM COATED ORAL at 11:07

## 2021-05-15 RX ADMIN — Medication 40 MILLIEQUIVALENT(S): at 16:56

## 2021-05-15 RX ADMIN — POLYETHYLENE GLYCOL 3350 17 GRAM(S): 17 POWDER, FOR SOLUTION ORAL at 11:07

## 2021-05-15 RX ADMIN — ONDANSETRON 4 MILLIGRAM(S): 8 TABLET, FILM COATED ORAL at 18:05

## 2021-05-15 RX ADMIN — HEPARIN SODIUM 5000 UNIT(S): 5000 INJECTION INTRAVENOUS; SUBCUTANEOUS at 05:28

## 2021-05-15 RX ADMIN — ONDANSETRON 4 MILLIGRAM(S): 8 TABLET, FILM COATED ORAL at 05:28

## 2021-05-15 RX ADMIN — Medication 50 GRAM(S): at 16:56

## 2021-05-15 RX ADMIN — HEPARIN SODIUM 5000 UNIT(S): 5000 INJECTION INTRAVENOUS; SUBCUTANEOUS at 18:05

## 2021-05-15 NOTE — DISCHARGE NOTE ANTEPARTUM - CARE PLAN
Principal Discharge DX:	Pyelonephritis  Goal:	recovery  Assessment and plan of treatment:	patient admitted with clinical signs of pyelonephritis with hx of recent UTI however UCx was negative and symptoms improved.

## 2021-05-15 NOTE — DISCHARGE NOTE ANTEPARTUM - HOSPITAL COURSE
Patient admitted on 5/13 with R CVA tenderness and suprapubic pain. Patient had recent UTI on 4/28 which was treated with Macrobid which bacteria was sensitive to. Patient remained afebrile and pain improved. UCx showed no growth. MRI abdomen showed no acute findings and normal appendix. Patient was given Ceftriaxone 5/13-5/14. She was also found to have a yeast infection and was started on terconazole. Patient was found to have hypokalemia and was given potassium supplementation.               9.0    7.06  )-----------( 309      ( 05-15 @ 07:15 )             27.9                9.2    8.41  )-----------( 308      ( 05-14 @ 07:08 )             28.9                10.4   10.15 )-----------( 326      ( 05-13 @ 00:37 )             33.0                                                 9.0                   Neurophils% (auto):   63.6   (05-15 @ 07:15):    7.06 )-----------(309          Lymphocytes% (auto):  26.1                                          27.9                   Eosinphils% (auto):   1.1      Manual%: Neutrophils x    ; Lymphocytes x    ; Eosinophils x    ; Bands%: x    ; Blasts x          05-15    135  |  106  |  6<L>  ----------------------------<  74  3.1<L>   |  18<L>  |  0.56    Ca    8.4      15 May 2021 07:15    TPro  6.1  /  Alb  3.3  /  TBili  0.3  /  DBili  x   /  AST  13  /  ALT  11  /  AlkPhos  53  05-15          RECENT CULTURES:  05-13 @ 06:12 .Blood Blood-Venous     No growth to date.      05-13 @ 00:03 .Urine Clean Catch (Midstream)     <10,000 CFU/mL Normal Urogenital [place]

## 2021-05-15 NOTE — DISCHARGE NOTE ANTEPARTUM - ADDITIONAL INSTRUCTIONS
Please follow up with your doctor as scheduled. Please return if pain worsens, you have painful or bloody urination, you have leaking of fluid or vaginal bleeding.

## 2021-05-15 NOTE — PROGRESS NOTE ADULT - ASSESSMENT
46y  at 16w2d presenting with flank pain and chills at home x 1 day in the setting of treated UTI. Patient admitted for presumed pyelo. Subsequent cultures negative with resolution of back pain.    # UTI v pyelo  - s/p ceftriaxone (-)  - AM CBC/CMP  - f/u BCx (); UA clean, UCx negative  - Renal sono (): wnl  - tylenol prn for pain    # abdominal pain  - f/u MRI  - c/w bowel regimen  - AM CBC/CMP    # FWB  - FHR prn    # MWB  - HSQ   - Reg diet  - maintain active T+S  - COVID neg  - Terconazole for yeast infection    Shakira Tinsley PGY3

## 2021-05-15 NOTE — DISCHARGE NOTE ANTEPARTUM - CARE PROVIDER_API CALL
Lito Fang (MD)  Obstetrics and Gynecology  [address] [facility], [place] [address]  Phone: [phone]  Fax: [phone]  Follow Up Time:

## 2021-05-15 NOTE — PROGRESS NOTE ADULT - ATTENDING COMMENTS
This morning patient reports abdominal pain is 7/10, intermittent and on wither side  We reviewed likely round ligament pain but will await pelvic MRI to rule GI pathology  If MRI is normal can take Tylenol prn and follow up with Dr Fang in office for prenatal care

## 2021-05-15 NOTE — PROGRESS NOTE ADULT - SUBJECTIVE AND OBJECTIVE BOX
NAY RODERICK    R3 Antepartum Note, HD#3    Interval events: abdominal pain improved. One bowel movement overnight, back pain resolved.    Patient seen and examined at bedside, no acute overnight events. No acute complaints. Pt denies LOF, VB, ctx, HA, epigastric pain, blurred vision, CP, SOB, N/V, fevers, and chills.    Vital Signs Last 24 Hours  T(C): 37.1 (05-15-21 @ 02:03), Max: 37.1 (05-15-21 @ 02:03)  HR: 87 (05-15-21 @ 02:03) (69 - 87)  BP: 91/51 (05-15-21 @ 02:03) (91/51 - 106/60)  RR: 17 (05-15-21 @ 02:03) (17 - 18)  SpO2: 100% (05-15-21 @ 02:03) (99% - 100%)    CAPILLARY BLOOD GLUCOSE      POCT Blood Glucose.: 103 mg/dL (14 May 2021 08:27)      Physical Exam:  General: NAD  Abdomen: Soft, non-tender, gravid  Ext: No pain or swelling      Labs:             9.2    8.41  )-----------( 308      ( 05-14 @ 07:08 )             28.9                   MEDICATIONS  (STANDING):  heparin   Injectable 5000 Unit(s) SubCutaneous every 12 hours  ondansetron   Disintegrating Tablet 4 milliGRAM(s) Oral every 6 hours  polyethylene glycol 3350 17 Gram(s) Oral daily  terconazole Vaginal Suppository 80 milliGRAM(s) Vaginal at bedtime    MEDICATIONS  (PRN):  acetaminophen   Tablet .. 975 milliGRAM(s) Oral every 6 hours PRN Mild Pain (1 - 3)

## 2021-05-15 NOTE — DISCHARGE NOTE ANTEPARTUM - MEDICATION SUMMARY - MEDICATIONS TO TAKE
I will START or STAY ON the medications listed below when I get home from the hospital:    terconazole 80 mg vaginal suppository  -- 1 suppository(ies) vaginally once a day (at bedtime) MDD:1  -- Indication: For vaginitis

## 2021-05-15 NOTE — DISCHARGE NOTE ANTEPARTUM - PLAN OF CARE
recovery patient admitted with clinical signs of pyelonephritis with hx of recent UTI however UCx was negative and symptoms improved.

## 2021-05-15 NOTE — DISCHARGE NOTE ANTEPARTUM - PATIENT PORTAL LINK FT
You can access the FollowMyHealth Patient Portal offered by [facility] by registering at the following website: [email or web address]. By joining [employer]’s FollowMyHealth portal, you will also be able to view your health information using other applications (apps) compatible with our system.

## 2021-05-15 NOTE — DISCHARGE NOTE NURSING/CASE MANAGEMENT/SOCIAL WORK - PATIENT PORTAL LINK FT
You can access the FollowMyHealth Patient Portal offered by [facility] by registering at the following website: [email or web address]. By joining [employer]’s FollowMyHealth portal, you will also be able to view your health information using other applications (apps) compatible with our system.

## 2021-05-18 LAB
CULTURE RESULTS: SIGNIFICANT CHANGE UP
CULTURE RESULTS: SIGNIFICANT CHANGE UP
SPECIMEN SOURCE: SIGNIFICANT CHANGE UP
SPECIMEN SOURCE: SIGNIFICANT CHANGE UP

## 2021-05-25 ENCOUNTER — APPOINTMENT (OUTPATIENT)
Dept: MATERNAL FETAL MEDICINE | Facility: CLINIC | Age: 46
End: 2021-05-25
Payer: MEDICARE

## 2021-05-25 ENCOUNTER — ASOB RESULT (OUTPATIENT)
Age: 46
End: 2021-05-25

## 2021-05-25 DIAGNOSIS — O21.9 VOMITING OF PREGNANCY, UNSPECIFIED: ICD-10-CM

## 2021-05-25 DIAGNOSIS — O09.521 SUPERVISION OF ELDERLY MULTIGRAVIDA, FIRST TRIMESTER: ICD-10-CM

## 2021-05-25 PROCEDURE — G0108 DIAB MANAGE TRN  PER INDIV: CPT | Mod: 95

## 2021-06-07 ENCOUNTER — APPOINTMENT (OUTPATIENT)
Dept: OBGYN | Facility: CLINIC | Age: 46
End: 2021-06-07
Payer: MEDICARE

## 2021-06-07 ENCOUNTER — NON-APPOINTMENT (OUTPATIENT)
Age: 46
End: 2021-06-07

## 2021-06-07 VITALS
SYSTOLIC BLOOD PRESSURE: 110 MMHG | BODY MASS INDEX: 33.64 KG/M2 | WEIGHT: 201.9 LBS | DIASTOLIC BLOOD PRESSURE: 74 MMHG | HEIGHT: 65 IN

## 2021-06-07 PROCEDURE — 0502F SUBSEQUENT PRENATAL CARE: CPT

## 2021-06-09 LAB — BACTERIA UR CULT: NORMAL

## 2021-06-16 ENCOUNTER — APPOINTMENT (OUTPATIENT)
Dept: MATERNAL FETAL MEDICINE | Facility: CLINIC | Age: 46
End: 2021-06-16
Payer: MEDICARE

## 2021-06-16 ENCOUNTER — ASOB RESULT (OUTPATIENT)
Age: 46
End: 2021-06-16

## 2021-06-16 ENCOUNTER — APPOINTMENT (OUTPATIENT)
Dept: ANTEPARTUM | Facility: CLINIC | Age: 46
End: 2021-06-16
Payer: MEDICARE

## 2021-06-16 VITALS
WEIGHT: 201 LBS | HEART RATE: 86 BPM | HEIGHT: 65 IN | OXYGEN SATURATION: 98 % | SYSTOLIC BLOOD PRESSURE: 128 MMHG | BODY MASS INDEX: 33.49 KG/M2 | DIASTOLIC BLOOD PRESSURE: 84 MMHG | RESPIRATION RATE: 18 BRPM

## 2021-06-16 DIAGNOSIS — Z78.9 OTHER SPECIFIED HEALTH STATUS: ICD-10-CM

## 2021-06-16 DIAGNOSIS — Z86.2 PERSONAL HISTORY OF DISEASES OF THE BLOOD AND BLOOD-FORMING ORGANS AND CERTAIN DISORDERS INVOLVING THE IMMUNE MECHANISM: ICD-10-CM

## 2021-06-16 DIAGNOSIS — Z82.49 FAMILY HISTORY OF ISCHEMIC HEART DISEASE AND OTHER DISEASES OF THE CIRCULATORY SYSTEM: ICD-10-CM

## 2021-06-16 PROCEDURE — 76811 OB US DETAILED SNGL FETUS: CPT

## 2021-06-16 PROCEDURE — 76817 TRANSVAGINAL US OBSTETRIC: CPT

## 2021-06-16 PROCEDURE — 99214 OFFICE O/P EST MOD 30 MIN: CPT

## 2021-06-16 NOTE — DATA REVIEWED
[FreeTextEntry1] : Sonogram today shows appropriate fetal growth and size. The fetal survey appears normal, with no markers for aneuploidy or anomaly\par \par A fetal echo is advised due to the early gestational diabetes as well as maternal age. \par \par Review of home glucose shows overall good control of the post prandial values, with fasting values often elevated. \par \par Reviewing Nirvas scheduled shows late mealtimes, and irregular testing and sleep habits. Some of her fasting values are on the low side in the low 70's raising concerns for medication. We agreed for her to be more consistent with her dinner and bed time, and for her to test more regularly in the morning. Additionally, overnight testing may be useful to tell if her numbers are dropping while she sleeps. \par \par She will continue to moderate her diet and test 4X daily

## 2021-06-16 NOTE — DISCUSSION/SUMMARY
[FreeTextEntry1] : Make adjustments to diet and testing as stated above. \par \par Continue testing 4 X daily\par \par Medical nutrition followup is scheduled. \par \par Repeat growth scan in 4 weeks with MFM consultation. \par \par Fetal echo is needed.

## 2021-06-24 ENCOUNTER — APPOINTMENT (OUTPATIENT)
Dept: MATERNAL FETAL MEDICINE | Facility: CLINIC | Age: 46
End: 2021-06-24

## 2021-07-12 ENCOUNTER — NON-APPOINTMENT (OUTPATIENT)
Age: 46
End: 2021-07-12

## 2021-07-12 ENCOUNTER — APPOINTMENT (OUTPATIENT)
Dept: OBGYN | Facility: CLINIC | Age: 46
End: 2021-07-12
Payer: MEDICARE

## 2021-07-12 ENCOUNTER — LABORATORY RESULT (OUTPATIENT)
Age: 46
End: 2021-07-12

## 2021-07-12 VITALS
HEART RATE: 87 BPM | BODY MASS INDEX: 33.99 KG/M2 | SYSTOLIC BLOOD PRESSURE: 119 MMHG | HEIGHT: 65 IN | DIASTOLIC BLOOD PRESSURE: 76 MMHG | WEIGHT: 204 LBS

## 2021-07-12 DIAGNOSIS — O99.012 ANEMIA COMPLICATING PREGNANCY, SECOND TRIMESTER: ICD-10-CM

## 2021-07-12 PROCEDURE — 0502F SUBSEQUENT PRENATAL CARE: CPT

## 2021-07-14 LAB
BASOPHILS # BLD AUTO: 0.04 K/UL
BASOPHILS NFR BLD AUTO: 0.5 %
EOSINOPHIL # BLD AUTO: 0.02 K/UL
EOSINOPHIL NFR BLD AUTO: 0.2 %
HCT VFR BLD CALC: 31.9 %
HGB BLD-MCNC: 10 G/DL
IMM GRANULOCYTES NFR BLD AUTO: 1.1 %
LYMPHOCYTES # BLD AUTO: 1.48 K/UL
LYMPHOCYTES NFR BLD AUTO: 18.2 %
MAN DIFF?: NORMAL
MCHC RBC-ENTMCNC: 28.2 PG
MCHC RBC-ENTMCNC: 31.3 GM/DL
MCV RBC AUTO: 89.9 FL
MONOCYTES # BLD AUTO: 0.77 K/UL
MONOCYTES NFR BLD AUTO: 9.5 %
NEUTROPHILS # BLD AUTO: 5.71 K/UL
NEUTROPHILS NFR BLD AUTO: 70.5 %
PLATELET # BLD AUTO: 266 K/UL
RBC # BLD: 3.55 M/UL
RBC # FLD: 16.5 %
WBC # FLD AUTO: 8.11 K/UL

## 2021-07-15 ENCOUNTER — APPOINTMENT (OUTPATIENT)
Dept: MATERNAL FETAL MEDICINE | Facility: CLINIC | Age: 46
End: 2021-07-15
Payer: MEDICARE

## 2021-07-15 ENCOUNTER — APPOINTMENT (OUTPATIENT)
Dept: ANTEPARTUM | Facility: CLINIC | Age: 46
End: 2021-07-15
Payer: MEDICARE

## 2021-07-15 ENCOUNTER — ASOB RESULT (OUTPATIENT)
Age: 46
End: 2021-07-15

## 2021-07-15 VITALS
WEIGHT: 205 LBS | DIASTOLIC BLOOD PRESSURE: 68 MMHG | SYSTOLIC BLOOD PRESSURE: 118 MMHG | HEART RATE: 85 BPM | BODY MASS INDEX: 34.16 KG/M2 | RESPIRATION RATE: 18 BRPM | HEIGHT: 65 IN | OXYGEN SATURATION: 98 %

## 2021-07-15 PROCEDURE — 76816 OB US FOLLOW-UP PER FETUS: CPT

## 2021-07-15 PROCEDURE — 99214 OFFICE O/P EST MOD 30 MIN: CPT

## 2021-07-15 RX ORDER — METRONIDAZOLE 7.5 MG/G
0.75 GEL VAGINAL
Qty: 1 | Refills: 0 | Status: DISCONTINUED | COMMUNITY
Start: 2021-05-03 | End: 2021-07-15

## 2021-07-15 RX ORDER — NITROFURANTOIN (MONOHYDRATE/MACROCRYSTALS) 25; 75 MG/1; MG/1
100 CAPSULE ORAL
Qty: 14 | Refills: 0 | Status: DISCONTINUED | COMMUNITY
Start: 2021-05-03 | End: 2021-07-15

## 2021-07-15 RX ORDER — DOXYLAMINE SUCCINATE AND PYRIDOXINE HYDROCHLORIDE 10; 10 MG/1; MG/1
10-10 TABLET, DELAYED RELEASE ORAL
Qty: 30 | Refills: 1 | Status: DISCONTINUED | COMMUNITY
Start: 2021-04-09 | End: 2021-07-15

## 2021-07-15 RX ORDER — PYRIDOXINE HCL (VITAMIN B6) 25 MG
25 TABLET ORAL
Qty: 90 | Refills: 0 | Status: DISCONTINUED | COMMUNITY
Start: 2021-04-28 | End: 2021-07-15

## 2021-07-15 NOTE — DATA REVIEWED
[FreeTextEntry1] : Sonogram today shows good interval growth with the overall fetal size at the 56th percentile. \par \par Review of glucose control shows adequate control of fasting and post prandial, with improvements since last month. Her fastings are mostly under 90, and when she is testing overnight they seem to be int he 85-95 range reassuring the absence of hypoglycemic events\par \par Her post prandial values are mostly under 140, with occasional elevations typically due to poor food choices ([employer]).\par \par She will begin to record her food choices when the values are elevated to improve understanding of the link between diet and glucose control

## 2021-07-15 NOTE — DISCUSSION/SUMMARY
[FreeTextEntry1] : Repeat sonogram for growth in 4 weeks. \par \par Repeat MFM in 4 weeks. \par \par Weekly fetal testing to begin at 36 weeks if still diet controlled.

## 2021-07-20 ENCOUNTER — APPOINTMENT (OUTPATIENT)
Dept: MATERNAL FETAL MEDICINE | Facility: CLINIC | Age: 46
End: 2021-07-20
Payer: MEDICARE

## 2021-07-20 ENCOUNTER — APPOINTMENT (OUTPATIENT)
Dept: PEDIATRIC CARDIOLOGY | Facility: CLINIC | Age: 46
End: 2021-07-20
Payer: MEDICARE

## 2021-07-20 ENCOUNTER — ASOB RESULT (OUTPATIENT)
Age: 46
End: 2021-07-20

## 2021-07-20 VITALS — BODY MASS INDEX: 33.99 KG/M2 | WEIGHT: 204 LBS | HEIGHT: 65 IN

## 2021-07-20 PROCEDURE — 76827 ECHO EXAM OF FETAL HEART: CPT

## 2021-07-20 PROCEDURE — 93325 DOPPLER ECHO COLOR FLOW MAPG: CPT

## 2021-07-20 PROCEDURE — 76825 ECHO EXAM OF FETAL HEART: CPT

## 2021-07-20 PROCEDURE — G0108 DIAB MANAGE TRN  PER INDIV: CPT | Mod: 95

## 2021-07-20 PROCEDURE — 99202 OFFICE O/P NEW SF 15 MIN: CPT | Mod: 25

## 2021-08-06 ENCOUNTER — NON-APPOINTMENT (OUTPATIENT)
Age: 46
End: 2021-08-06

## 2021-08-06 ENCOUNTER — APPOINTMENT (OUTPATIENT)
Dept: OBGYN | Facility: CLINIC | Age: 46
End: 2021-08-06
Payer: MEDICARE

## 2021-08-06 VITALS
HEART RATE: 84 BPM | WEIGHT: 208 LBS | HEIGHT: 65 IN | BODY MASS INDEX: 34.66 KG/M2 | SYSTOLIC BLOOD PRESSURE: 117 MMHG | DIASTOLIC BLOOD PRESSURE: 78 MMHG

## 2021-08-06 DIAGNOSIS — Z23 ENCOUNTER FOR IMMUNIZATION: ICD-10-CM

## 2021-08-06 PROCEDURE — 0502F SUBSEQUENT PRENATAL CARE: CPT

## 2021-08-06 PROCEDURE — 90715 TDAP VACCINE 7 YRS/> IM: CPT

## 2021-08-06 PROCEDURE — 90471 IMMUNIZATION ADMIN: CPT

## 2021-08-06 RX ORDER — DOXYLAMINE SUCCINATE 25 MG
25 TABLET ORAL
Qty: 45 | Refills: 1 | Status: DISCONTINUED | COMMUNITY
Start: 2021-04-28 | End: 2021-08-06

## 2021-08-12 ENCOUNTER — APPOINTMENT (OUTPATIENT)
Dept: ANTEPARTUM | Facility: CLINIC | Age: 46
End: 2021-08-12
Payer: MEDICARE

## 2021-08-12 ENCOUNTER — APPOINTMENT (OUTPATIENT)
Dept: MATERNAL FETAL MEDICINE | Facility: CLINIC | Age: 46
End: 2021-08-12
Payer: MEDICARE

## 2021-08-12 ENCOUNTER — ASOB RESULT (OUTPATIENT)
Age: 46
End: 2021-08-12

## 2021-08-12 VITALS
WEIGHT: 207 LBS | RESPIRATION RATE: 18 BRPM | DIASTOLIC BLOOD PRESSURE: 66 MMHG | OXYGEN SATURATION: 98 % | HEART RATE: 89 BPM | BODY MASS INDEX: 34.49 KG/M2 | SYSTOLIC BLOOD PRESSURE: 108 MMHG | HEIGHT: 65 IN

## 2021-08-12 PROCEDURE — 76816 OB US FOLLOW-UP PER FETUS: CPT

## 2021-08-12 PROCEDURE — 99214 OFFICE O/P EST MOD 30 MIN: CPT

## 2021-08-12 NOTE — DATA REVIEWED
[FreeTextEntry1] : Sonogram today shows good fetal growth with the overall fetal size at the 75th percentile. \par \par Review of glucose shows sporadic elevations of fasting and post prandial values. We discussed different food choices she is taking that are causing elevations such as high starch meals with yams and bananas, and she expresses understanding on changes she needs to make. \par \par Additionally her nighttime routine will be adjusted slightly to allow for the difficulty she is having with heartburn and protein bedtime snacks. For now we will continue diet control, and continue to monitor the values for worsening numbers,.

## 2021-08-12 NOTE — DISCUSSION/SUMMARY
[FreeTextEntry1] : Repeat growth scan with MFM consultation is scheduled for 4 weeks. \par \par Followup with medical nutrition is scheduled in 2 weeks. \par \par Weekly fetal testing to begin at 36 weeks, or earlier if medication is needed for glucose control.

## 2021-08-19 ENCOUNTER — ASOB RESULT (OUTPATIENT)
Age: 46
End: 2021-08-19

## 2021-08-19 ENCOUNTER — APPOINTMENT (OUTPATIENT)
Dept: MATERNAL FETAL MEDICINE | Facility: CLINIC | Age: 46
End: 2021-08-19
Payer: MEDICARE

## 2021-08-19 VITALS — BODY MASS INDEX: 34.49 KG/M2 | HEIGHT: 65 IN | WEIGHT: 207 LBS

## 2021-08-19 PROCEDURE — G0108 DIAB MANAGE TRN  PER INDIV: CPT | Mod: 95

## 2021-08-24 ENCOUNTER — APPOINTMENT (OUTPATIENT)
Dept: OBGYN | Facility: CLINIC | Age: 46
End: 2021-08-24
Payer: MEDICARE

## 2021-08-24 VITALS
SYSTOLIC BLOOD PRESSURE: 110 MMHG | HEIGHT: 65 IN | WEIGHT: 208 LBS | DIASTOLIC BLOOD PRESSURE: 73 MMHG | BODY MASS INDEX: 34.66 KG/M2 | HEART RATE: 80 BPM | TEMPERATURE: 96.8 F

## 2021-08-24 PROCEDURE — 0502F SUBSEQUENT PRENATAL CARE: CPT

## 2021-09-03 ENCOUNTER — APPOINTMENT (OUTPATIENT)
Dept: OBGYN | Facility: CLINIC | Age: 46
End: 2021-09-03
Payer: MEDICARE

## 2021-09-03 VITALS
BODY MASS INDEX: 34.82 KG/M2 | DIASTOLIC BLOOD PRESSURE: 75 MMHG | HEART RATE: 80 BPM | SYSTOLIC BLOOD PRESSURE: 109 MMHG | HEIGHT: 65 IN | WEIGHT: 209 LBS

## 2021-09-03 DIAGNOSIS — N90.89 OTHER SPECIFIED NONINFLAMMATORY DISORDERS OF VULVA AND PERINEUM: ICD-10-CM

## 2021-09-03 PROCEDURE — 0502F SUBSEQUENT PRENATAL CARE: CPT

## 2021-09-03 PROCEDURE — 56605 BIOPSY OF VULVA/PERINEUM: CPT

## 2021-09-09 ENCOUNTER — APPOINTMENT (OUTPATIENT)
Dept: ANTEPARTUM | Facility: CLINIC | Age: 46
End: 2021-09-09
Payer: MEDICARE

## 2021-09-09 ENCOUNTER — ASOB RESULT (OUTPATIENT)
Age: 46
End: 2021-09-09

## 2021-09-09 ENCOUNTER — APPOINTMENT (OUTPATIENT)
Dept: MATERNAL FETAL MEDICINE | Facility: CLINIC | Age: 46
End: 2021-09-09
Payer: MEDICARE

## 2021-09-09 VITALS
HEART RATE: 70 BPM | WEIGHT: 208 LBS | SYSTOLIC BLOOD PRESSURE: 102 MMHG | RESPIRATION RATE: 18 BRPM | HEIGHT: 65 IN | BODY MASS INDEX: 34.66 KG/M2 | DIASTOLIC BLOOD PRESSURE: 80 MMHG | OXYGEN SATURATION: 98 %

## 2021-09-09 DIAGNOSIS — O24.419 GESTATIONAL DIABETES MELLITUS IN PREGNANCY, UNSPECIFIED CONTROL: ICD-10-CM

## 2021-09-09 DIAGNOSIS — O09.522 SUPERVISION OF ELDERLY MULTIGRAVIDA, SECOND TRIMESTER: ICD-10-CM

## 2021-09-09 PROCEDURE — 76816 OB US FOLLOW-UP PER FETUS: CPT

## 2021-09-09 PROCEDURE — 99214 OFFICE O/P EST MOD 30 MIN: CPT

## 2021-09-09 PROCEDURE — 76820 UMBILICAL ARTERY ECHO: CPT

## 2021-09-09 RX ORDER — FAMOTIDINE 20 MG/1
20 TABLET, FILM COATED ORAL
Refills: 0 | Status: DISCONTINUED | COMMUNITY
End: 2021-09-09

## 2021-09-09 NOTE — DATA REVIEWED
[FreeTextEntry1] : Sonogram today shows good interval growth with the overall fetal size at the 46th percentile. \par \par Normal fluid is noted, as is good fetal movement. \par \par Review of glucose levels show good control of post prandial values, with occasional mild elevations when poor food choices - or skipping meals - is noted. \par \par Her fasting values are trending upwards, which Meron attributes to poor sleep and large amounts of stress. We discussed some relaxation techniques and she will continue to test glucose levels

## 2021-09-09 NOTE — DISCUSSION/SUMMARY
[FreeTextEntry1] : Weekly fetal testing to begin at 36 weeks for GDM A1\par \par Repeat growth and MFM consultation in 4 weeks. \par \par Followup with medical nutrition is scheduled.

## 2021-09-13 LAB — CORE LAB BIOPSY: NORMAL

## 2021-09-16 ENCOUNTER — APPOINTMENT (OUTPATIENT)
Dept: MATERNAL FETAL MEDICINE | Facility: CLINIC | Age: 46
End: 2021-09-16
Payer: MEDICARE

## 2021-09-16 ENCOUNTER — ASOB RESULT (OUTPATIENT)
Age: 46
End: 2021-09-16

## 2021-09-16 VITALS — HEIGHT: 65 IN | WEIGHT: 207 LBS | BODY MASS INDEX: 34.49 KG/M2

## 2021-09-16 PROCEDURE — G0108 DIAB MANAGE TRN  PER INDIV: CPT | Mod: 95

## 2021-09-17 ENCOUNTER — APPOINTMENT (OUTPATIENT)
Dept: OBGYN | Facility: CLINIC | Age: 46
End: 2021-09-17
Payer: MEDICARE

## 2021-09-17 VITALS
BODY MASS INDEX: 34.66 KG/M2 | HEART RATE: 83 BPM | WEIGHT: 208 LBS | DIASTOLIC BLOOD PRESSURE: 72 MMHG | SYSTOLIC BLOOD PRESSURE: 108 MMHG | HEIGHT: 65 IN

## 2021-09-17 PROCEDURE — 0502F SUBSEQUENT PRENATAL CARE: CPT

## 2021-09-23 ENCOUNTER — APPOINTMENT (OUTPATIENT)
Dept: ANTEPARTUM | Facility: CLINIC | Age: 46
End: 2021-09-23
Payer: MEDICARE

## 2021-09-23 ENCOUNTER — ASOB RESULT (OUTPATIENT)
Age: 46
End: 2021-09-23

## 2021-09-23 PROCEDURE — 76818 FETAL BIOPHYS PROFILE W/NST: CPT

## 2021-09-30 ENCOUNTER — APPOINTMENT (OUTPATIENT)
Dept: OBGYN | Facility: CLINIC | Age: 46
End: 2021-09-30
Payer: MEDICARE

## 2021-09-30 VITALS
SYSTOLIC BLOOD PRESSURE: 118 MMHG | WEIGHT: 211 LBS | BODY MASS INDEX: 35.11 KG/M2 | DIASTOLIC BLOOD PRESSURE: 76 MMHG | HEART RATE: 73 BPM

## 2021-09-30 PROCEDURE — 0502F SUBSEQUENT PRENATAL CARE: CPT

## 2021-10-01 LAB
BASOPHILS # BLD AUTO: 0.02 K/UL
BASOPHILS NFR BLD AUTO: 0.3 %
EOSINOPHIL # BLD AUTO: 0.04 K/UL
EOSINOPHIL NFR BLD AUTO: 0.7 %
HCT VFR BLD CALC: 35 %
HGB BLD-MCNC: 11.6 G/DL
HIV1+2 AB SPEC QL IA.RAPID: NONREACTIVE
IMM GRANULOCYTES NFR BLD AUTO: 0.8 %
LYMPHOCYTES # BLD AUTO: 1.6 K/UL
LYMPHOCYTES NFR BLD AUTO: 26.2 %
MAN DIFF?: NORMAL
MCHC RBC-ENTMCNC: 31 PG
MCHC RBC-ENTMCNC: 33.1 GM/DL
MCV RBC AUTO: 93.6 FL
MONOCYTES # BLD AUTO: 0.7 K/UL
MONOCYTES NFR BLD AUTO: 11.5 %
NEUTROPHILS # BLD AUTO: 3.69 K/UL
NEUTROPHILS NFR BLD AUTO: 60.5 %
PLATELET # BLD AUTO: 236 K/UL
RBC # BLD: 3.74 M/UL
RBC # FLD: 14.8 %
T PALLIDUM AB SER QL IA: NEGATIVE
WBC # FLD AUTO: 6.1 K/UL

## 2021-10-02 LAB
GP B STREP DNA SPEC QL NAA+PROBE: DETECTED
GP B STREP DNA SPEC QL NAA+PROBE: NORMAL
SOURCE GBS: NORMAL

## 2021-10-07 ENCOUNTER — APPOINTMENT (OUTPATIENT)
Dept: ANTEPARTUM | Facility: CLINIC | Age: 46
End: 2021-10-07
Payer: MEDICARE

## 2021-10-07 ENCOUNTER — ASOB RESULT (OUTPATIENT)
Age: 46
End: 2021-10-07

## 2021-10-07 ENCOUNTER — APPOINTMENT (OUTPATIENT)
Dept: MATERNAL FETAL MEDICINE | Facility: CLINIC | Age: 46
End: 2021-10-07
Payer: MEDICARE

## 2021-10-07 VITALS
OXYGEN SATURATION: 99 % | HEART RATE: 68 BPM | SYSTOLIC BLOOD PRESSURE: 118 MMHG | BODY MASS INDEX: 34.99 KG/M2 | RESPIRATION RATE: 16 BRPM | DIASTOLIC BLOOD PRESSURE: 62 MMHG | HEIGHT: 65 IN | WEIGHT: 210 LBS

## 2021-10-07 DIAGNOSIS — O24.419 GESTATIONAL DIABETES MELLITUS IN PREGNANCY, UNSPECIFIED CONTROL: ICD-10-CM

## 2021-10-07 DIAGNOSIS — O24.919 UNSPECIFIED DIABETES MELLITUS IN PREGNANCY, UNSPECIFIED TRIMESTER: ICD-10-CM

## 2021-10-07 DIAGNOSIS — O09.523 SUPERVISION OF ELDERLY MULTIGRAVIDA, THIRD TRIMESTER: ICD-10-CM

## 2021-10-07 PROCEDURE — 76816 OB US FOLLOW-UP PER FETUS: CPT

## 2021-10-07 PROCEDURE — 99214 OFFICE O/P EST MOD 30 MIN: CPT

## 2021-10-07 NOTE — DATA REVIEWED
[FreeTextEntry1] : Sonogram today shows good intervalg rowth with the overall fetal size at the 56th percentile. \par \par Overall good glucose control is noted. Fating and post prandial values are well controlled with occasional mild elevations due to food choices noted. \par \par We discussed the importance of dietary consistency, and Meron understands the benefit of continuing the diet and testing for the duration of the pregnancy

## 2021-10-07 NOTE — DISCUSSION/SUMMARY
[FreeTextEntry1] : Continue dietary modifications as above, and test glucose 4X daily\par \par Weekly fetal testing due to GDM A1

## 2021-10-08 ENCOUNTER — APPOINTMENT (OUTPATIENT)
Dept: OBGYN | Facility: CLINIC | Age: 46
End: 2021-10-08
Payer: MEDICARE

## 2021-10-08 VITALS
HEIGHT: 65 IN | DIASTOLIC BLOOD PRESSURE: 75 MMHG | HEART RATE: 72 BPM | SYSTOLIC BLOOD PRESSURE: 114 MMHG | BODY MASS INDEX: 34.99 KG/M2 | TEMPERATURE: 96 F | WEIGHT: 210 LBS

## 2021-10-08 PROCEDURE — 0502F SUBSEQUENT PRENATAL CARE: CPT

## 2021-10-13 ENCOUNTER — APPOINTMENT (OUTPATIENT)
Dept: OBGYN | Facility: CLINIC | Age: 46
End: 2021-10-13
Payer: MEDICARE

## 2021-10-13 VITALS
HEIGHT: 65 IN | BODY MASS INDEX: 35.16 KG/M2 | DIASTOLIC BLOOD PRESSURE: 78 MMHG | HEART RATE: 77 BPM | SYSTOLIC BLOOD PRESSURE: 116 MMHG | WEIGHT: 211 LBS

## 2021-10-13 PROCEDURE — 0502F SUBSEQUENT PRENATAL CARE: CPT

## 2021-10-14 ENCOUNTER — APPOINTMENT (OUTPATIENT)
Dept: ANTEPARTUM | Facility: CLINIC | Age: 46
End: 2021-10-14
Payer: MEDICARE

## 2021-10-14 ENCOUNTER — APPOINTMENT (OUTPATIENT)
Dept: MATERNAL FETAL MEDICINE | Facility: CLINIC | Age: 46
End: 2021-10-14

## 2021-10-14 ENCOUNTER — ASOB RESULT (OUTPATIENT)
Age: 46
End: 2021-10-14

## 2021-10-14 PROCEDURE — 76818 FETAL BIOPHYS PROFILE W/NST: CPT

## 2021-10-15 ENCOUNTER — RX RENEWAL (OUTPATIENT)
Age: 46
End: 2021-10-15

## 2021-10-18 ENCOUNTER — OUTPATIENT (OUTPATIENT)
Dept: OUTPATIENT SERVICES | Facility: HOSPITAL | Age: 46
LOS: 1 days | End: 2021-10-18

## 2021-10-18 VITALS
WEIGHT: 203.93 LBS | OXYGEN SATURATION: 98 % | SYSTOLIC BLOOD PRESSURE: 118 MMHG | HEIGHT: 66 IN | DIASTOLIC BLOOD PRESSURE: 80 MMHG | RESPIRATION RATE: 16 BRPM | TEMPERATURE: 98 F | HEART RATE: 65 BPM

## 2021-10-18 DIAGNOSIS — Z98.890 OTHER SPECIFIED POSTPROCEDURAL STATES: Chronic | ICD-10-CM

## 2021-10-18 DIAGNOSIS — O24.419 GESTATIONAL DIABETES MELLITUS IN PREGNANCY, UNSPECIFIED CONTROL: ICD-10-CM

## 2021-10-18 DIAGNOSIS — Z98.891 HISTORY OF UTERINE SCAR FROM PREVIOUS SURGERY: Chronic | ICD-10-CM

## 2021-10-18 DIAGNOSIS — M54.2 CERVICALGIA: ICD-10-CM

## 2021-10-18 DIAGNOSIS — M54.2 CERVICALGIA: Chronic | ICD-10-CM

## 2021-10-18 DIAGNOSIS — Z34.90 ENCOUNTER FOR SUPERVISION OF NORMAL PREGNANCY, UNSPECIFIED, UNSPECIFIED TRIMESTER: ICD-10-CM

## 2021-10-18 LAB
ANION GAP SERPL CALC-SCNC: 16 MMOL/L — HIGH (ref 7–14)
APPEARANCE UR: CLEAR — SIGNIFICANT CHANGE UP
BILIRUB UR-MCNC: NEGATIVE — SIGNIFICANT CHANGE UP
BLD GP AB SCN SERPL QL: NEGATIVE — SIGNIFICANT CHANGE UP
BUN SERPL-MCNC: 8 MG/DL — SIGNIFICANT CHANGE UP (ref 7–23)
CALCIUM SERPL-MCNC: 9 MG/DL — SIGNIFICANT CHANGE UP (ref 8.4–10.5)
CHLORIDE SERPL-SCNC: 103 MMOL/L — SIGNIFICANT CHANGE UP (ref 98–107)
CO2 SERPL-SCNC: 17 MMOL/L — LOW (ref 22–31)
COLOR SPEC: YELLOW — SIGNIFICANT CHANGE UP
CREAT SERPL-MCNC: 0.65 MG/DL — SIGNIFICANT CHANGE UP (ref 0.5–1.3)
DIFF PNL FLD: NEGATIVE — SIGNIFICANT CHANGE UP
GLUCOSE SERPL-MCNC: 104 MG/DL — HIGH (ref 70–99)
GLUCOSE UR QL: NEGATIVE — SIGNIFICANT CHANGE UP
HCT VFR BLD CALC: 37.6 % — SIGNIFICANT CHANGE UP (ref 34.5–45)
HGB BLD-MCNC: 12.9 G/DL — SIGNIFICANT CHANGE UP (ref 11.5–15.5)
KETONES UR-MCNC: ABNORMAL
LEUKOCYTE ESTERASE UR-ACNC: NEGATIVE — SIGNIFICANT CHANGE UP
MCHC RBC-ENTMCNC: 30.9 PG — SIGNIFICANT CHANGE UP (ref 27–34)
MCHC RBC-ENTMCNC: 34.3 GM/DL — SIGNIFICANT CHANGE UP (ref 32–36)
MCV RBC AUTO: 90 FL — SIGNIFICANT CHANGE UP (ref 80–100)
NITRITE UR-MCNC: NEGATIVE — SIGNIFICANT CHANGE UP
NRBC # BLD: 0 /100 WBCS — SIGNIFICANT CHANGE UP
NRBC # FLD: 0 K/UL — SIGNIFICANT CHANGE UP
PH UR: 6.5 — SIGNIFICANT CHANGE UP (ref 5–8)
PLATELET # BLD AUTO: 248 K/UL — SIGNIFICANT CHANGE UP (ref 150–400)
POTASSIUM SERPL-MCNC: 3.2 MMOL/L — LOW (ref 3.5–5.3)
POTASSIUM SERPL-SCNC: 3.2 MMOL/L — LOW (ref 3.5–5.3)
PROT UR-MCNC: ABNORMAL
RBC # BLD: 4.18 M/UL — SIGNIFICANT CHANGE UP (ref 3.8–5.2)
RBC # FLD: 13.9 % — SIGNIFICANT CHANGE UP (ref 10.3–14.5)
RH IG SCN BLD-IMP: POSITIVE — SIGNIFICANT CHANGE UP
SODIUM SERPL-SCNC: 136 MMOL/L — SIGNIFICANT CHANGE UP (ref 135–145)
SP GR SPEC: 1.02 — SIGNIFICANT CHANGE UP (ref 1–1.05)
UROBILINOGEN FLD QL: SIGNIFICANT CHANGE UP
WBC # BLD: 6.69 K/UL — SIGNIFICANT CHANGE UP (ref 3.8–10.5)
WBC # FLD AUTO: 6.69 K/UL — SIGNIFICANT CHANGE UP (ref 3.8–10.5)

## 2021-10-18 NOTE — OB PST NOTE - PROBLEM SELECTOR PLAN 3
Pt s/p Anterior Cervical Fusion  Pt reports Injury to Cervical and Lumbar spine  Pt with appt 11/21/21 with Neurosurgeon Dr Heredia    Request Consult with most  recent studies Cervical and Lumbar spine Pt s/p Anterior Cervical Fusion  Pt reports Injury to Cervical and Lumbar spine > 2.5  years ago   Pt states recently s/w Dr Heredia neurosurgeon   Pt with appt 11/21/21  for evaluation with Neurosurgeon Dr Heredia    Request Consult with most  recent studies Cervical and Lumbar spine Pt s/p Anterior Cervical Fusion  Pt reports Injury to Cervical and Lumbar spine > 2.5  years ago   Pt states recently s/w Dr Heredia neurosurgeon   Pt with appt 11/21/21  for evaluation with Neurosurgeon    Request Consult with most  recent studies Cervical and Lumbar spine

## 2021-10-18 NOTE — OB PST NOTE - TEACHING/LEARNING RELIGIOUS CONSIDERATIONS
Pt does not eat port ; Pt is a 7 day Caodaism/Oriental orthodox considerations Pt does not eat pork ; Pt is a 7 day Pentecostalism/Denominational considerations

## 2021-10-18 NOTE — OB PST NOTE - NSHPREVIEWOFSYSTEMS_GEN_ALL_CORE
General: + 12 lb weight gain in pregnancy No fever, chills, sweating, anorexia,  No polyphagia, polyurea, polydypsia, malaise, or fatigue    Skin: No rashes, itching, or dryness. No change in size/color of moles. No tumors, brittle nails, pitted nails, or hair loss    Breast: No tenderness, lumps, or nipple discharge      Ophthalmologic: Corrective lenses No diplopia, photophobia, lacrimation, blurred Vision , or eye discharge    ENMT Symptoms: No hearing difficulty, ear pain, tinnitus, or vertigo. No sinus symptoms, nasal congestion, nasal   discharge, or nasal obstruction    Respiratory and Thorax: No wheezing, dyspnea, cough, hemoptysis, or pleuritic chest pain     Cardiovascular: No chest pain, palpitations, dyspnea on exertion, orthopnea, paroxysmal nocturnal dyspnea,   peripheral edema, or claudication    Gastrointestinal:+ Constipation  No nausea, vomiting, diarrhea, , change in bowel habits, flatulence, abdominal pain, or melena    Genitourinary/ Pelvis: No hematuria, renal colic, or flank pain.  No urine discoloration, incontinence, dysuria, or urinary hesitancy. Normal urinary frequency. No nocturia, abnormal vaginal bleeding, vaginal discharge, spotting, pelvic pain, or vaginal leakage    Musculoskeletal: h/o Neck pain, h/o lower back pain  ; No arthralgia, arthritis, joint swelling, muscle cramping, muscle weakness, neck pain, arm pain, or leg pain    Neurological: No transient paralysis, weakness, paresthesias, or seizures. No syncope, tremors, vertigo, loss of sensation, difficulty walking, loss of consciousness, hemiparesis, confusion, or facial palsy    Psychiatric:  Depression ; pt denies rx  No suicidal ideation, depression, anxiety, insomnia, memory loss, paranoia, mood swings, agitation, hallucinations, or hyperactivity    Hematology: No gum bleeding, nose bleeding, or skin lumps    Lymphatic: No enlarged or tender lymph nodes. No extremity swelling    Endocrine:  Gestational Diabetes  No heat or cold intolerance    Immunologic: No recurrent or persistent infections General: + 12 lb weight gain in pregnancy No fever, chills, sweating, anorexia,  No polyphagia, polyurea, polydypsia, malaise, or fatigue    Skin: No rashes, itching, or dryness. No change in size/color of moles. No tumors, brittle nails, pitted nails, or hair loss    Breast: No tenderness, lumps, or nipple discharge      Ophthalmologic: Corrective lenses No diplopia, photophobia, lacrimation, blurred Vision , or eye discharge    ENMT Symptoms: No hearing difficulty, ear pain, tinnitus, or vertigo. No sinus symptoms, nasal congestion, nasal   discharge, or nasal obstruction    Respiratory and Thorax: No wheezing, dyspnea, cough, hemoptysis, or pleuritic chest pain     Cardiovascular: No chest pain, palpitations, dyspnea on exertion, orthopnea, paroxysmal nocturnal dyspnea,   peripheral edema, or claudication    Gastrointestinal:+ Constipation  No nausea, vomiting, diarrhea, , change in bowel habits, flatulence, abdominal pain, or melena    Genitourinary/ Pelvis: No hematuria, renal colic, or flank pain.  No urine discoloration, incontinence, dysuria, or urinary hesitancy. Normal urinary frequency. No nocturia, abnormal vaginal bleeding, vaginal discharge, spotting, pelvic pain, or vaginal leakage    Musculoskeletal: h/o Neck pain, h/o lower back pain  ; No arthralgia, arthritis, joint swelling, muscle cramping, muscle weakness, neck pain, arm pain, or leg pain    Neurological: S/P anterior Cervical Fusion ; pt denies change neurological status ; pt denies any new sx .  No transient paralysis, weakness, paresthesias, or seizures. No syncope, tremors, vertigo, loss of sensation, difficulty walking, loss of consciousness, hemiparesis, confusion, or facial palsy    Psychiatric:  Depression ; pt denies rx  No suicidal ideation, depression, anxiety, insomnia, memory loss, paranoia, mood swings, agitation, hallucinations, or hyperactivity    Hematology: No gum bleeding, nose bleeding, or skin lumps    Lymphatic: No enlarged or tender lymph nodes. No extremity swelling    Endocrine:  Gestational Diabetes  No heat or cold intolerance    Immunologic: No recurrent or persistent infections General: + 12 lb weight gain in pregnancy No fever, chills, sweating, anorexia,  No polyphagia, polyurea, polydypsia, malaise, or fatigue    Skin: No rashes, itching, or dryness. No change in size/color of moles. No tumors, brittle nails, pitted nails, or hair loss    Breast: No tenderness, lumps, or nipple discharge      Ophthalmologic: Corrective lenses No diplopia, photophobia, lacrimation, blurred Vision , or eye discharge    ENMT Symptoms: No hearing difficulty, ear pain, tinnitus, or vertigo. No sinus symptoms, nasal congestion, nasal   discharge, or nasal obstruction    Respiratory and Thorax: No wheezing, dyspnea, cough, hemoptysis, or pleuritic chest pain     Cardiovascular: No chest pain, palpitations, dyspnea on exertion, orthopnea, paroxysmal nocturnal dyspnea,   peripheral edema, or claudication    Gastrointestinal:+ Constipation  No nausea, vomiting, diarrhea, , change in bowel habits, flatulence, abdominal pain, or melena    Genitourinary/ Pelvis: No hematuria, renal colic, or flank pain.  No urine discoloration, incontinence, dysuria, or urinary hesitancy. Normal urinary frequency. No nocturia, abnormal vaginal bleeding, vaginal discharge, spotting, pelvic pain, or vaginal leakage    Musculoskeletal: h/o Neck pain, h/o lower back pain  ; No arthralgia, arthritis, joint swelling, muscle cramping, muscle weakness, neck pain, arm pain, or leg pain    Neurological: S/P anterior Cervical Fusion  .  No transient paralysis, weakness, paresthesias, or seizures. No syncope, tremors, vertigo, loss of sensation, difficulty walking, loss of consciousness, hemiparesis, confusion, or facial palsy    Psychiatric:  Depression ; pt denies rx  No suicidal ideation, depression, anxiety, insomnia, memory loss, paranoia, mood swings, agitation, hallucinations, or hyperactivity    Hematology: No gum bleeding, nose bleeding, or skin lumps    Lymphatic: No enlarged or tender lymph nodes. No extremity swelling    Endocrine:  Gestational Diabetes  No heat or cold intolerance    Immunologic: No recurrent or persistent infections General: + 12 lb weight gain in pregnancy No fever, chills, sweating, anorexia,  No polyphagia, polyurea, polydypsia, malaise, or fatigue    Skin: No rashes, itching, or dryness. No change in size/color of moles. No tumors, brittle nails, pitted nails, or hair loss    Breast: No tenderness, lumps, or nipple discharge      Ophthalmologic: Corrective lenses No diplopia, photophobia, lacrimation, blurred Vision , or eye discharge    ENMT Symptoms: No hearing difficulty, ear pain, tinnitus, or vertigo. No sinus symptoms, nasal congestion, nasal   discharge, or nasal obstruction    Respiratory and Thorax: No wheezing, dyspnea, cough, hemoptysis, or pleuritic chest pain     Cardiovascular: No chest pain, palpitations, dyspnea on exertion, orthopnea, paroxysmal nocturnal dyspnea,   peripheral edema, or claudication    Gastrointestinal:+ Constipation  No nausea, vomiting, diarrhea, , change in bowel habits, flatulence, abdominal pain, or melena    Genitourinary/ Pelvis: No hematuria, renal colic, or flank pain.  No urine discoloration, incontinence, dysuria, or urinary hesitancy. Normal urinary frequency. No nocturia, abnormal vaginal bleeding, vaginal discharge, spotting, pelvic pain, or vaginal leakage    Musculoskeletal: h/o chronic  Neck pain, h/o chronic lower back pain lower back pain  ; No arthralgia, arthritis, joint swelling, muscle cramping, muscle weakness, neck pain, arm pain, or leg pain    Neurological: S/P Anterior Cervical Fusion  .  No transient paralysis, weakness, paresthesias, or seizures. No syncope, tremors, vertigo, loss of sensation, difficulty walking, loss of consciousness, hemiparesis, confusion, or facial palsy    Psychiatric:  Depression ; pt denies rx  No suicidal ideation, depression, anxiety, insomnia, memory loss, paranoia, mood swings, agitation, hallucinations, or hyperactivity    Hematology: No gum bleeding, nose bleeding, or skin lumps    Lymphatic: No enlarged or tender lymph nodes. No extremity swelling    Endocrine:  Gestational Diabetes  No heat or cold intolerance    Immunologic: No recurrent or persistent infections

## 2021-10-18 NOTE — OB PST NOTE - NSICDXPASTSURGICALHX_GEN_ALL_CORE_FT
PAST SURGICAL HISTORY:  Cervical pain (neck) Pt s/p Anterior Cervical Fusion > 2.5 years ago    S/P  section 11    Status post D&C > 12 years ago
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
## 2021-10-18 NOTE — OB PST NOTE - PROBLEM SELECTOR PLAN 1
Procedure as booked ; Repeat  section Bilateral salpingectomy    Pre op instructions including Hibiclens with teach back ; pt verbalized good understanding of pre op instructions

## 2021-10-19 ENCOUNTER — NON-APPOINTMENT (OUTPATIENT)
Age: 46
End: 2021-10-19

## 2021-10-21 ENCOUNTER — APPOINTMENT (OUTPATIENT)
Dept: OBGYN | Facility: CLINIC | Age: 46
End: 2021-10-21

## 2021-10-21 ENCOUNTER — APPOINTMENT (OUTPATIENT)
Dept: ANTEPARTUM | Facility: CLINIC | Age: 46
End: 2021-10-21
Payer: MEDICARE

## 2021-10-21 ENCOUNTER — ASOB RESULT (OUTPATIENT)
Age: 46
End: 2021-10-21

## 2021-10-21 PROBLEM — O24.419 GESTATIONAL DIABETES MELLITUS IN PREGNANCY, UNSPECIFIED CONTROL: Chronic | Status: ACTIVE | Noted: 2021-10-18
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
## 2021-10-21 PROBLEM — M54.50 LOW BACK PAIN, UNSPECIFIED: Chronic | Status: ACTIVE | Noted: 2021-10-18

## 2021-10-21 PROBLEM — M54.2 CERVICALGIA: Chronic | Status: ACTIVE | Noted: 2021-10-18

## 2021-10-21 PROBLEM — Z86.59 PERSONAL HISTORY OF OTHER MENTAL AND BEHAVIORAL DISORDERS: Chronic | Status: ACTIVE | Noted: 2021-10-18

## 2021-10-21 PROCEDURE — 76818 FETAL BIOPHYS PROFILE W/NST: CPT

## 2021-10-22 DIAGNOSIS — Z01.818 ENCOUNTER FOR OTHER PREPROCEDURAL EXAMINATION: ICD-10-CM

## 2021-10-23 ENCOUNTER — APPOINTMENT (OUTPATIENT)
Dept: DISASTER EMERGENCY | Facility: CLINIC | Age: 46
End: 2021-10-23

## 2021-10-24 LAB — SARS-COV-2 N GENE NPH QL NAA+PROBE: NOT DETECTED

## 2021-10-25 ENCOUNTER — TRANSCRIPTION ENCOUNTER (OUTPATIENT)
Age: 46
End: 2021-10-25

## 2021-10-26 ENCOUNTER — INPATIENT (INPATIENT)
Facility: HOSPITAL | Age: 46
LOS: 3 days | Discharge: HOME | End: 2021-10-30
Attending: STUDENT IN AN ORGANIZED HEALTH CARE EDUCATION/TRAINING PROGRAM | Admitting: STUDENT IN AN ORGANIZED HEALTH CARE EDUCATION/TRAINING PROGRAM
Payer: MEDICARE

## 2021-10-26 ENCOUNTER — APPOINTMENT (OUTPATIENT)
Dept: OBGYN | Facility: HOSPITAL | Age: 46
End: 2021-10-26

## 2021-10-26 ENCOUNTER — TRANSCRIPTION ENCOUNTER (OUTPATIENT)
Age: 46
End: 2021-10-26

## 2021-10-26 VITALS — DIASTOLIC BLOOD PRESSURE: 82 MMHG | SYSTOLIC BLOOD PRESSURE: 127 MMHG | HEART RATE: 72 BPM

## 2021-10-26 DIAGNOSIS — O24.419 GESTATIONAL DIABETES MELLITUS IN PREGNANCY, UNSPECIFIED CONTROL: ICD-10-CM

## 2021-10-26 DIAGNOSIS — M54.2 CERVICALGIA: Chronic | ICD-10-CM

## 2021-10-26 DIAGNOSIS — Z98.890 OTHER SPECIFIED POSTPROCEDURAL STATES: Chronic | ICD-10-CM

## 2021-10-26 DIAGNOSIS — Z98.891 HISTORY OF UTERINE SCAR FROM PREVIOUS SURGERY: Chronic | ICD-10-CM

## 2021-10-26 LAB
ALBUMIN SERPL ELPH-MCNC: 3.7 G/DL — SIGNIFICANT CHANGE UP (ref 3.3–5)
ALP SERPL-CCNC: 119 U/L — SIGNIFICANT CHANGE UP (ref 40–120)
ALT FLD-CCNC: 17 U/L — SIGNIFICANT CHANGE UP (ref 4–33)
ANION GAP SERPL CALC-SCNC: 15 MMOL/L — HIGH (ref 7–14)
AST SERPL-CCNC: 25 U/L — SIGNIFICANT CHANGE UP (ref 4–32)
BILIRUB SERPL-MCNC: 0.7 MG/DL — SIGNIFICANT CHANGE UP (ref 0.2–1.2)
BLD GP AB SCN SERPL QL: NEGATIVE — SIGNIFICANT CHANGE UP
BUN SERPL-MCNC: 10 MG/DL — SIGNIFICANT CHANGE UP (ref 7–23)
CALCIUM SERPL-MCNC: 9.3 MG/DL — SIGNIFICANT CHANGE UP (ref 8.4–10.5)
CHLORIDE SERPL-SCNC: 102 MMOL/L — SIGNIFICANT CHANGE UP (ref 98–107)
CO2 SERPL-SCNC: 17 MMOL/L — LOW (ref 22–31)
COVID-19 SPIKE DOMAIN AB INTERP: NEGATIVE — SIGNIFICANT CHANGE UP
COVID-19 SPIKE DOMAIN ANTIBODY RESULT: 0.4 U/ML — SIGNIFICANT CHANGE UP
CREAT SERPL-MCNC: 0.52 MG/DL — SIGNIFICANT CHANGE UP (ref 0.5–1.3)
GLUCOSE BLDC GLUCOMTR-MCNC: 78 MG/DL — SIGNIFICANT CHANGE UP (ref 70–99)
GLUCOSE SERPL-MCNC: 76 MG/DL — SIGNIFICANT CHANGE UP (ref 70–99)
HCT VFR BLD CALC: 36.2 % — SIGNIFICANT CHANGE UP (ref 34.5–45)
HGB BLD-MCNC: 12.6 G/DL — SIGNIFICANT CHANGE UP (ref 11.5–15.5)
MCHC RBC-ENTMCNC: 31.4 PG — SIGNIFICANT CHANGE UP (ref 27–34)
MCHC RBC-ENTMCNC: 34.8 GM/DL — SIGNIFICANT CHANGE UP (ref 32–36)
MCV RBC AUTO: 90.3 FL — SIGNIFICANT CHANGE UP (ref 80–100)
NRBC # BLD: 0 /100 WBCS — SIGNIFICANT CHANGE UP
NRBC # FLD: 0 K/UL — SIGNIFICANT CHANGE UP
PLATELET # BLD AUTO: 224 K/UL — SIGNIFICANT CHANGE UP (ref 150–400)
POTASSIUM SERPL-MCNC: 3.5 MMOL/L — SIGNIFICANT CHANGE UP (ref 3.5–5.3)
POTASSIUM SERPL-SCNC: 3.5 MMOL/L — SIGNIFICANT CHANGE UP (ref 3.5–5.3)
PROT SERPL-MCNC: 6.2 G/DL — SIGNIFICANT CHANGE UP (ref 6–8.3)
RBC # BLD: 4.01 M/UL — SIGNIFICANT CHANGE UP (ref 3.8–5.2)
RBC # FLD: 13.8 % — SIGNIFICANT CHANGE UP (ref 10.3–14.5)
RH IG SCN BLD-IMP: POSITIVE — SIGNIFICANT CHANGE UP
SARS-COV-2 IGG+IGM SERPL QL IA: 0.4 U/ML — SIGNIFICANT CHANGE UP
SARS-COV-2 IGG+IGM SERPL QL IA: NEGATIVE — SIGNIFICANT CHANGE UP
SODIUM SERPL-SCNC: 134 MMOL/L — LOW (ref 135–145)
T PALLIDUM AB TITR SER: NEGATIVE — SIGNIFICANT CHANGE UP
WBC # BLD: 6.68 K/UL — SIGNIFICANT CHANGE UP (ref 3.8–10.5)
WBC # FLD AUTO: 6.68 K/UL — SIGNIFICANT CHANGE UP (ref 3.8–10.5)

## 2021-10-26 PROCEDURE — 58611 LIGATE OVIDUCT(S) ADD-ON: CPT | Mod: U9,GC

## 2021-10-26 PROCEDURE — 59510 CESAREAN DELIVERY: CPT | Mod: U9,GC

## 2021-10-26 RX ORDER — ASCORBIC ACID 60 MG
1 TABLET,CHEWABLE ORAL
Qty: 0 | Refills: 0 | DISCHARGE

## 2021-10-26 RX ORDER — OXYCODONE HYDROCHLORIDE 5 MG/1
5 TABLET ORAL ONCE
Refills: 0 | Status: DISCONTINUED | OUTPATIENT
Start: 2021-10-26 | End: 2021-10-30

## 2021-10-26 RX ORDER — HEPARIN SODIUM 5000 [USP'U]/ML
5000 INJECTION INTRAVENOUS; SUBCUTANEOUS EVERY 12 HOURS
Refills: 0 | Status: DISCONTINUED | OUTPATIENT
Start: 2021-10-26 | End: 2021-10-30

## 2021-10-26 RX ORDER — FAMOTIDINE 10 MG/ML
20 INJECTION INTRAVENOUS ONCE
Refills: 0 | Status: COMPLETED | OUTPATIENT
Start: 2021-10-26 | End: 2021-10-26

## 2021-10-26 RX ORDER — OXYTOCIN 10 UNIT/ML
333.33 VIAL (ML) INJECTION
Qty: 20 | Refills: 0 | Status: DISCONTINUED | OUTPATIENT
Start: 2021-10-26 | End: 2021-10-26

## 2021-10-26 RX ORDER — SODIUM CHLORIDE 9 MG/ML
1000 INJECTION, SOLUTION INTRAVENOUS
Refills: 0 | Status: DISCONTINUED | OUTPATIENT
Start: 2021-10-26 | End: 2021-10-27

## 2021-10-26 RX ORDER — LANOLIN
1 OINTMENT (GRAM) TOPICAL EVERY 6 HOURS
Refills: 0 | Status: DISCONTINUED | OUTPATIENT
Start: 2021-10-26 | End: 2021-10-30

## 2021-10-26 RX ORDER — TETANUS TOXOID, REDUCED DIPHTHERIA TOXOID AND ACELLULAR PERTUSSIS VACCINE, ADSORBED 5; 2.5; 8; 8; 2.5 [IU]/.5ML; [IU]/.5ML; UG/.5ML; UG/.5ML; UG/.5ML
0.5 SUSPENSION INTRAMUSCULAR ONCE
Refills: 0 | Status: DISCONTINUED | OUTPATIENT
Start: 2021-10-26 | End: 2021-10-30

## 2021-10-26 RX ORDER — OXYCODONE HYDROCHLORIDE 5 MG/1
5 TABLET ORAL
Refills: 0 | Status: DISCONTINUED | OUTPATIENT
Start: 2021-10-26 | End: 2021-10-27

## 2021-10-26 RX ORDER — IBUPROFEN 200 MG
600 TABLET ORAL EVERY 6 HOURS
Refills: 0 | Status: COMPLETED | OUTPATIENT
Start: 2021-10-26 | End: 2022-09-24

## 2021-10-26 RX ORDER — NALBUPHINE HYDROCHLORIDE 10 MG/ML
2.5 INJECTION, SOLUTION INTRAMUSCULAR; INTRAVENOUS; SUBCUTANEOUS EVERY 6 HOURS
Refills: 0 | Status: DISCONTINUED | OUTPATIENT
Start: 2021-10-26 | End: 2021-10-27

## 2021-10-26 RX ORDER — NALOXONE HYDROCHLORIDE 4 MG/.1ML
0.1 SPRAY NASAL
Refills: 0 | Status: DISCONTINUED | OUTPATIENT
Start: 2021-10-26 | End: 2021-10-27

## 2021-10-26 RX ORDER — SODIUM CHLORIDE 9 MG/ML
1000 INJECTION, SOLUTION INTRAVENOUS ONCE
Refills: 0 | Status: COMPLETED | OUTPATIENT
Start: 2021-10-26 | End: 2021-10-26

## 2021-10-26 RX ORDER — KETOROLAC TROMETHAMINE 30 MG/ML
30 SYRINGE (ML) INJECTION EVERY 6 HOURS
Refills: 0 | Status: DISCONTINUED | OUTPATIENT
Start: 2021-10-26 | End: 2021-10-27

## 2021-10-26 RX ORDER — SIMETHICONE 80 MG/1
80 TABLET, CHEWABLE ORAL EVERY 4 HOURS
Refills: 0 | Status: DISCONTINUED | OUTPATIENT
Start: 2021-10-26 | End: 2021-10-30

## 2021-10-26 RX ORDER — OXYTOCIN 10 UNIT/ML
333.33 VIAL (ML) INJECTION
Qty: 20 | Refills: 0 | Status: DISCONTINUED | OUTPATIENT
Start: 2021-10-26 | End: 2021-10-27

## 2021-10-26 RX ORDER — ACETAMINOPHEN 500 MG
1000 TABLET ORAL ONCE
Refills: 0 | Status: COMPLETED | OUTPATIENT
Start: 2021-10-26 | End: 2021-10-26

## 2021-10-26 RX ORDER — OXYCODONE HYDROCHLORIDE 5 MG/1
5 TABLET ORAL
Refills: 0 | Status: DISCONTINUED | OUTPATIENT
Start: 2021-10-26 | End: 2021-10-30

## 2021-10-26 RX ORDER — CITRIC ACID/SODIUM CITRATE 300-500 MG
30 SOLUTION, ORAL ORAL ONCE
Refills: 0 | Status: COMPLETED | OUTPATIENT
Start: 2021-10-26 | End: 2021-10-26

## 2021-10-26 RX ORDER — ONDANSETRON 8 MG/1
4 TABLET, FILM COATED ORAL EVERY 6 HOURS
Refills: 0 | Status: DISCONTINUED | OUTPATIENT
Start: 2021-10-26 | End: 2021-10-30

## 2021-10-26 RX ORDER — INFLUENZA VIRUS VACCINE 15; 15; 15; 15 UG/.5ML; UG/.5ML; UG/.5ML; UG/.5ML
0.5 SUSPENSION INTRAMUSCULAR ONCE
Refills: 0 | Status: COMPLETED | OUTPATIENT
Start: 2021-10-26 | End: 2021-10-26

## 2021-10-26 RX ORDER — OXYCODONE HYDROCHLORIDE 5 MG/1
10 TABLET ORAL
Refills: 0 | Status: DISCONTINUED | OUTPATIENT
Start: 2021-10-26 | End: 2021-10-27

## 2021-10-26 RX ORDER — DIPHENHYDRAMINE HCL 50 MG
25 CAPSULE ORAL EVERY 6 HOURS
Refills: 0 | Status: COMPLETED | OUTPATIENT
Start: 2021-10-26 | End: 2022-09-24

## 2021-10-26 RX ORDER — DEXAMETHASONE 0.5 MG/5ML
4 ELIXIR ORAL ONCE
Refills: 0 | Status: DISCONTINUED | OUTPATIENT
Start: 2021-10-26 | End: 2021-10-27

## 2021-10-26 RX ORDER — ONDANSETRON 8 MG/1
4 TABLET, FILM COATED ORAL ONCE
Refills: 0 | Status: DISCONTINUED | OUTPATIENT
Start: 2021-10-26 | End: 2021-10-26

## 2021-10-26 RX ORDER — SODIUM CHLORIDE 9 MG/ML
1000 INJECTION, SOLUTION INTRAVENOUS
Refills: 0 | Status: DISCONTINUED | OUTPATIENT
Start: 2021-10-26 | End: 2021-10-26

## 2021-10-26 RX ORDER — ACETAMINOPHEN 500 MG
975 TABLET ORAL
Refills: 0 | Status: DISCONTINUED | OUTPATIENT
Start: 2021-10-26 | End: 2021-10-30

## 2021-10-26 RX ORDER — MAGNESIUM HYDROXIDE 400 MG/1
30 TABLET, CHEWABLE ORAL
Refills: 0 | Status: DISCONTINUED | OUTPATIENT
Start: 2021-10-26 | End: 2021-10-30

## 2021-10-26 RX ADMIN — FAMOTIDINE 20 MILLIGRAM(S): 10 INJECTION INTRAVENOUS at 10:12

## 2021-10-26 RX ADMIN — Medication 1000 MILLIUNIT(S)/MIN: at 13:13

## 2021-10-26 RX ADMIN — Medication 30 MILLILITER(S): at 10:11

## 2021-10-26 RX ADMIN — SODIUM CHLORIDE 125 MILLILITER(S): 9 INJECTION, SOLUTION INTRAVENOUS at 10:12

## 2021-10-26 RX ADMIN — SODIUM CHLORIDE 75 MILLILITER(S): 9 INJECTION, SOLUTION INTRAVENOUS at 13:13

## 2021-10-26 RX ADMIN — Medication 1000 MILLIGRAM(S): at 16:16

## 2021-10-26 RX ADMIN — HEPARIN SODIUM 5000 UNIT(S): 5000 INJECTION INTRAVENOUS; SUBCUTANEOUS at 19:00

## 2021-10-26 RX ADMIN — SODIUM CHLORIDE 2000 MILLILITER(S): 9 INJECTION, SOLUTION INTRAVENOUS at 10:12

## 2021-10-26 RX ADMIN — Medication 400 MILLIGRAM(S): at 16:00

## 2021-10-26 RX ADMIN — SODIUM CHLORIDE 1000 MILLILITER(S): 9 INJECTION, SOLUTION INTRAVENOUS at 15:10

## 2021-10-26 RX ADMIN — Medication 30 MILLIGRAM(S): at 19:25

## 2021-10-26 RX ADMIN — Medication 30 MILLIGRAM(S): at 19:00

## 2021-10-26 NOTE — OB RN INTRAOPERATIVE NOTE - NSSELHIDDEN_OBGYN_ALL_OB_FT
[NS_[email or web address]_ALL_OB_FT:[id number]],[NS_DeliveryAssist1_OBGYN_ALL_OB_FT:[id number]],[NS_DeliveryRN_OBGYN_ALL_OB_FT:[id number]]
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
## 2021-10-26 NOTE — OB PROVIDER H&P - HISTORY OF PRESENT ILLNESS
HPI: Pt is a 45 yo  @39+6 presenting for rLTCS and BS.  FM +  LOF denies   CTX denies   VB denies     Prenatal Issues:   LIST  antepartum admissions  GBS  Sono anomolies, genetic testing, infections, IVF?  EFW    OBHx:  - term, , dates, mode of delivery, indication for c/s, weights, associated complications    Gyn Hx:  - fibroids, ovarian cysts, PID, STDs, abnormal paps, HPV, infertility?, GYN surger?    PMH:   - asthma (last exacerbation, frequency in pregnancy, hospitalizations?, intubations?, steroids)  - thyroid  - DM, HTN, Renal, CV    SHx:  - open vs. laparoscopic    Psych:   - h/o depression/anxiety, PP depression?    Social:  - tobacco, alcohol, drugs in pregnancy and before    Medications:    Allergies:    Will Accept blood transfusion?    Vital Signs Last 24 Hrs  T(C): 36.9 (26 Oct 2021 09:36), Max: 36.9 (26 Oct 2021 09:20)  T(F): 98.4 (26 Oct 2021 09:36), Max: 98.42 (26 Oct 2021 09:20)  HR: 72 (26 Oct 2021 09:36) (72 - 72)  BP: 127/82 (26 Oct 2021 09:36) (127/82 - 127/82)  BP(mean): --  RR: 14 (26 Oct 2021 09:36) (14 - 14)  SpO2: --    Physical Exam:  Gen: NAD, AOx3  CV: RR  Resp: unlabored respirations  Abd: soft, NT, ND    Speculum Exam:   - pooling, nitrazine, ferning, bleeding   - (lesions if pt has history of HSV)    SVE:  FHT:  [place]:  EFW: Sono/leopolds  Sono: fetal presentation, placentation  BPP:     A/P: Pt is a _yo G_P_ who presents [active labor/SROM/induction of labor].  - prenatal issues, GBS status    1. Admit to LND. Routine Labs. IVF  2. Expectant Management vs. IOL  3. Fetus: Cat X tracing, Vertex, EFW _ . C/w EFM.  4+ List all prenatal issues w/ a plan (PEC, GDM, TOLAC, Asthma, etc)  5. GBS status + plan  6. Pain: IV pain meds/epidural PRN           HPI: Pt is a 45 yo  @39+6 presenting for rLTCS and BS.  FM +  LOF denies   CTX denies   VB denies     Prenatal Issues: GDMA1  Antepartum admissions: -5/15 @16 weeks, for UTI with R CVA tenderness. Treated with ceftriaxone for UTI and terconazole yeast infection. Also found to be hypokalemic (3.1) and given K supplementation  GBS positive   EFW 3800g    OBHx:  G1: , SAB, D+C  G2: ,  due to NRFHT, 10#  G3: current     Gyn Hx:  denies     PMH:   denies     SHx:     cervical spine fusion C3-T2    Psych:   denies     Social:  denies     Medications: bASA, PNV    Allergies: none     Will Accept blood transfusion? yes     Vital Signs Last 24 Hrs  T(C): 36.9 (26 Oct 2021 09:36), Max: 36.9 (26 Oct 2021 09:20)  T(F): 98.4 (26 Oct 2021 09:36), Max: 98.42 (26 Oct 2021 09:20)  HR: 72 (26 Oct 2021 09:36) (72 - 72)  BP: 127/82 (26 Oct 2021 09:36) (127/82 - 127/82)  BP(mean): --  RR: 14 (26 Oct 2021 09:36) (14 - 14)  SpO2: --    Physical Exam:  Gen: NAD, AOx3  CV: RR  Resp: unlabored respirations  Abd: soft, NT, ND      FHT: 130s, moderate variability, accels, no decels   [place]: uterine irritability   EFW: 3800g  Sono: vertex, fundal

## 2021-10-26 NOTE — DISCHARGE NOTE OB - MEDICATION SUMMARY - MEDICATIONS TO TAKE
I will START or STAY ON the medications listed below when I get home from the hospital:    Motrin 600 mg oral tablet  -- 1 tab(s) by mouth every 6 hours  -- Indication: For pain    Tylenol 325 mg oral tablet  -- 2 tab(s) by mouth every 4 hours  -- Indication: For pain

## 2021-10-26 NOTE — OB PROVIDER H&P - ASSESSMENT
A/P: Pt is a 45 yo  @ 39+6 who presents rLTCS and BS.   - GDMA1, GBS positive     1. Admit to LND. Routine Labs. IVF  2. rLTCS and BS   3. Fetus: Cat I tracing, Vertex, EFW 3800g. C/w EFM.  4. GDMA1: FS monitoring   5. GBS positive   6. Pain: IV pain meds/epidural PRN    Jessika Calvert, PGY1

## 2021-10-26 NOTE — DISCHARGE NOTE OB - CARE PROVIDER_API CALL
Kirill Reis)  OBSGYN  General  [facility], [address] [place] [address]  Phone: [phone]  Fax: [phone]  Follow Up Time:

## 2021-10-26 NOTE — OB PROVIDER DELIVERY SUMMARY - NSSELHIDDEN_OBGYN_ALL_OB_FT
[NS_[email or web address]_ALL_OB_FT:[id number]],[NS_DeliveryAssist1_OBGYN_ALL_OB_FT:[id number]],[NS_DeliveryRN_OBGYN_ALL_OB_FT:[id number]]

## 2021-10-26 NOTE — DISCHARGE NOTE OB - PATIENT PORTAL LINK FT
You can access the FollowMyHealth Patient Portal offered by [facility] by registering at the following website: [email or web address]. By joining [facility]’s FollowMyHealth portal, you will also be able to view your health information using other applications (apps) compatible with our system.

## 2021-10-26 NOTE — OB RN PREOPERATIVE CHECKLIST - NSTRANFUSIONOBJECTION_GEN_ALL_CORE_SIUH
Received referral from Dr. Thompson to schedule patient an apt for dx: hemoglobin C trait. Called patient twice on different occasions with no success. Left pt detailed vm with call back number for a new pt apt.   Patient has no objection to blood transfusions.

## 2021-10-26 NOTE — DISCHARGE NOTE OB - CARE PLAN
1 Principal Discharge DX:	 delivery delivered  Assessment and plan of treatment:	diet and activity as tolerated

## 2021-10-26 NOTE — DISCHARGE NOTE OB - HOSPITAL COURSE
status post rLTCS, single liveborn male infant apgars 8,9  Upon discharge, patient is spontaneously voiding, tolerating a regular diet, out of bed, and reports adequate pain control

## 2021-10-26 NOTE — OB PROVIDER DELIVERY SUMMARY - NSPROVIDERDELIVERYNOTE_OBGYN_ALL_OB_FT
repeat LTCS/BS, uncomplicated  viable male infant, vertex presentation, 8lbs, Apgars 8,9, cord gasses sent,  findings notable for left upper extremity mass likely extra digit  EDEN dressing  Grossly normal fallopian tubes, uterus, and ovaries    EBL: 309  IVF: 3200  UOP: 95    Gayle PGY2

## 2021-10-26 NOTE — PROVIDER CONTACT NOTE (OTHER) - ASSESSMENT
Pt is AOx4 and able to make her needs known. No complaints of pain.  Vitals stable, lungs clear.  Urine dark and concentrated.
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
## 2021-10-26 NOTE — OB RN DELIVERY SUMMARY - BABY A: WEIGHT (GM) DELIVERY
Plan: Consider OCP Continue Regimen: Aczone QAM and TID for 3 days as a spot treatment\\nRetin-A Micro 0.08% QHS\\nSpironolactone 50 mg daily Detail Level: Zone Render In Strict Bullet Format?: No 8672

## 2021-10-27 ENCOUNTER — RESULT REVIEW (OUTPATIENT)
Age: 46
End: 2021-10-27

## 2021-10-27 LAB
BASOPHILS # BLD AUTO: 0.02 K/UL — SIGNIFICANT CHANGE UP (ref 0–0.2)
BASOPHILS NFR BLD AUTO: 0.2 % — SIGNIFICANT CHANGE UP (ref 0–2)
EOSINOPHIL # BLD AUTO: 0.05 K/UL — SIGNIFICANT CHANGE UP (ref 0–0.5)
EOSINOPHIL NFR BLD AUTO: 0.6 % — SIGNIFICANT CHANGE UP (ref 0–6)
HCT VFR BLD CALC: 32.7 % — LOW (ref 34.5–45)
HGB BLD-MCNC: 11.3 G/DL — LOW (ref 11.5–15.5)
IANC: 6.77 K/UL — SIGNIFICANT CHANGE UP (ref 1.5–8.5)
IMM GRANULOCYTES NFR BLD AUTO: 0.6 % — SIGNIFICANT CHANGE UP (ref 0–1.5)
LYMPHOCYTES # BLD AUTO: 1.32 K/UL — SIGNIFICANT CHANGE UP (ref 1–3.3)
LYMPHOCYTES # BLD AUTO: 14.9 % — SIGNIFICANT CHANGE UP (ref 13–44)
MCHC RBC-ENTMCNC: 31.4 PG — SIGNIFICANT CHANGE UP (ref 27–34)
MCHC RBC-ENTMCNC: 34.6 GM/DL — SIGNIFICANT CHANGE UP (ref 32–36)
MCV RBC AUTO: 90.8 FL — SIGNIFICANT CHANGE UP (ref 80–100)
MONOCYTES # BLD AUTO: 0.67 K/UL — SIGNIFICANT CHANGE UP (ref 0–0.9)
MONOCYTES NFR BLD AUTO: 7.5 % — SIGNIFICANT CHANGE UP (ref 2–14)
NEUTROPHILS # BLD AUTO: 6.77 K/UL — SIGNIFICANT CHANGE UP (ref 1.8–7.4)
NEUTROPHILS NFR BLD AUTO: 76.2 % — SIGNIFICANT CHANGE UP (ref 43–77)
NRBC # BLD: 0 /100 WBCS — SIGNIFICANT CHANGE UP
NRBC # FLD: 0 K/UL — SIGNIFICANT CHANGE UP
PLATELET # BLD AUTO: 196 K/UL — SIGNIFICANT CHANGE UP (ref 150–400)
RBC # BLD: 3.6 M/UL — LOW (ref 3.8–5.2)
RBC # FLD: 13.7 % — SIGNIFICANT CHANGE UP (ref 10.3–14.5)
WBC # BLD: 8.88 K/UL — SIGNIFICANT CHANGE UP (ref 3.8–10.5)
WBC # FLD AUTO: 8.88 K/UL — SIGNIFICANT CHANGE UP (ref 3.8–10.5)

## 2021-10-27 PROCEDURE — 88302 TISSUE EXAM BY PATHOLOGIST: CPT | Mod: 26

## 2021-10-27 RX ORDER — IBUPROFEN 200 MG
600 TABLET ORAL EVERY 6 HOURS
Refills: 0 | Status: DISCONTINUED | OUTPATIENT
Start: 2021-10-27 | End: 2021-10-30

## 2021-10-27 RX ORDER — DIPHENHYDRAMINE HCL 50 MG
25 CAPSULE ORAL EVERY 6 HOURS
Refills: 0 | Status: DISCONTINUED | OUTPATIENT
Start: 2021-10-27 | End: 2021-10-30

## 2021-10-27 RX ADMIN — NALBUPHINE HYDROCHLORIDE 2.5 MILLIGRAM(S): 10 INJECTION, SOLUTION INTRAMUSCULAR; INTRAVENOUS; SUBCUTANEOUS at 02:00

## 2021-10-27 RX ADMIN — HEPARIN SODIUM 5000 UNIT(S): 5000 INJECTION INTRAVENOUS; SUBCUTANEOUS at 21:57

## 2021-10-27 RX ADMIN — Medication 600 MILLIGRAM(S): at 18:36

## 2021-10-27 RX ADMIN — Medication 975 MILLIGRAM(S): at 16:30

## 2021-10-27 RX ADMIN — Medication 30 MILLIGRAM(S): at 11:35

## 2021-10-27 RX ADMIN — Medication 975 MILLIGRAM(S): at 09:11

## 2021-10-27 RX ADMIN — Medication 975 MILLIGRAM(S): at 21:57

## 2021-10-27 RX ADMIN — Medication 30 MILLIGRAM(S): at 12:00

## 2021-10-27 RX ADMIN — Medication 600 MILLIGRAM(S): at 18:10

## 2021-10-27 RX ADMIN — Medication 975 MILLIGRAM(S): at 22:27

## 2021-10-27 RX ADMIN — Medication 30 MILLIGRAM(S): at 06:53

## 2021-10-27 RX ADMIN — Medication 975 MILLIGRAM(S): at 09:40

## 2021-10-27 RX ADMIN — Medication 975 MILLIGRAM(S): at 15:50

## 2021-10-27 RX ADMIN — HEPARIN SODIUM 5000 UNIT(S): 5000 INJECTION INTRAVENOUS; SUBCUTANEOUS at 09:06

## 2021-10-27 RX ADMIN — Medication 30 MILLIGRAM(S): at 05:42

## 2021-10-27 RX ADMIN — Medication 25 MILLIGRAM(S): at 09:07

## 2021-10-27 RX ADMIN — NALBUPHINE HYDROCHLORIDE 2.5 MILLIGRAM(S): 10 INJECTION, SOLUTION INTRAMUSCULAR; INTRAVENOUS; SUBCUTANEOUS at 01:19

## 2021-10-27 NOTE — PROGRESS NOTE ADULT - SUBJECTIVE AND OBJECTIVE BOX
OB Postpartum Note:  Delivery    S: 47yo A1 now POD #1 s/p LTCS. Her pain is well controlled. She is tolerating a regular diet and has passed flatus. Had some concern with numbness and tickling on both hands. Voiding spontaneously and ambulating without difficulty. Denies N/V. Denies CP/SOB/lightheadedness/dizziness.     O:   Vitals:  Vital Signs Last 24 Hrs  T(C): 36.7 (27 Oct 2021 09:58), Max: 37.1 (27 Oct 2021 01:42)  T(F): 98 (27 Oct 2021 09:58), Max: 98.7 (27 Oct 2021 01:42)  HR: 86 (27 Oct 2021 09:58) (49 - 86)  BP: 113/65 (27 Oct 2021 09:58) (94/74 - 136/63)  BP(mean): 74 (26 Oct 2021 20:00) (67 - 89)  RR: 18 (27 Oct 2021 09:58) (14 - 22)  SpO2: 99% (27 Oct 2021 09:58) (97% - 100%)    MEDICATIONS  (STANDING):  acetaminophen     Tablet .. 975 milliGRAM(s) Oral <User Schedule>  diphtheria/tetanus/pertussis (acellular) Vaccine (ADAcel) 0.5 milliLiter(s) IntraMuscular once  heparin   Injectable 5000 Unit(s) SubCutaneous every 12 hours  ibuprofen  Tablet. 600 milliGRAM(s) Oral every 6 hours  influenza   Vaccine 0.5 milliLiter(s) IntraMuscular once  ketorolac   Injectable 30 milliGRAM(s) IV Push every 6 hours  lactated ringers. 1000 milliLiter(s) (125 mL/Hr) IV Continuous <Continuous>  lactated ringers. 1000 milliLiter(s) (75 mL/Hr) IV Continuous <Continuous>  oxytocin Infusion 333.333 milliUNIT(s)/Min (1000 mL/Hr) IV Continuous <Continuous>    MEDICATIONS  (PRN):  dexAMETHasone  Injectable 4 milliGRAM(s) IV Push once PRN Nausea  diphenhydrAMINE 25 milliGRAM(s) Oral every 6 hours PRN Pruritus  lanolin Ointment 1 Application(s) Topical every 6 hours PRN Sore Nipples  magnesium hydroxide Suspension 30 milliLiter(s) Oral two times a day PRN Constipation  nalbuphine Injectable 2.5 milliGRAM(s) IV Push every 6 hours PRN Pruritus  naloxone Injectable 0.1 milliGRAM(s) IV Push every 3 minutes PRN For ANY of the following changes in patient status:  A. Breaths Per Minute LESS THAN 10, B. Oxygen saturation LESS THAN 90%, C. Sedation score of 6 for Stop After: 4 Times  ondansetron Injectable 4 milliGRAM(s) IV Push every 6 hours PRN Nausea  oxyCODONE    IR 5 milliGRAM(s) Oral every 3 hours PRN Mild Pain (1 - 3)  oxyCODONE    IR 10 milliGRAM(s) Oral every 3 hours PRN Moderate Pain (4 - 6)  oxyCODONE    IR 5 milliGRAM(s) Oral every 3 hours PRN Moderate to Severe Pain (4-10)  oxyCODONE    IR 5 milliGRAM(s) Oral once PRN Moderate to Severe Pain (4-10)  simethicone 80 milliGRAM(s) Chew every 4 hours PRN Gas      Labs:  Blood type: O Positive  Rubella IgG: RPR: Negative                          11.3<L>   8.88 >-----------< 196    ( 10-27 @ 06:20 )             32.7<L>                        12.6   6.68 >-----------< 224    ( 10-26 @ 09:44 )             36.2    10-26-21 @ 09:44      134<L>  |  102  |  10  ----------------------------<  76  3.5   |  17<L>  |  0.52        Ca    9.3      26 Oct 2021 09:44    TPro  6.2  /  Alb  3.7  /  TBili  0.7  /  DBili  x   /  AST  25  /  ALT  17  /  AlkPhos  119  10-26-21 @ 09:44          PE:  General: NAD, patient resting comfortably in bed  Abdomen: Mildly distended, appropriately tender  Incision: Clean, dry, intact.  Extremities: SCDs in place, no erythema     OB Postpartum Note:  Delivery    S: 45yo A1 now POD #1 s/p LTCS. Her pain is well controlled. She is tolerating a regular diet and has passed flatus. Had some concern with numbness and tickling on both hands. Voiding spontaneously and ambulating without difficulty. Denies N/V. Denies CP/SOB/lightheadedness/dizziness.     O:   Vitals:  Vital Signs Last 24 Hrs  T(C): 36.7 (27 Oct 2021 09:58), Max: 37.1 (27 Oct 2021 01:42)  T(F): 98 (27 Oct 2021 09:58), Max: 98.7 (27 Oct 2021 01:42)  HR: 86 (27 Oct 2021 09:58) (49 - 86)  BP: 113/65 (27 Oct 2021 09:58) (94/74 - 136/63)  BP(mean): 74 (26 Oct 2021 20:00) (67 - 89)  RR: 18 (27 Oct 2021 09:58) (14 - 22)  SpO2: 99% (27 Oct 2021 09:58) (97% - 100%)    MEDICATIONS  (STANDING):  acetaminophen     Tablet .. 975 milliGRAM(s) Oral <User Schedule>  diphtheria/tetanus/pertussis (acellular) Vaccine (ADAcel) 0.5 milliLiter(s) IntraMuscular once  heparin   Injectable 5000 Unit(s) SubCutaneous every 12 hours  ibuprofen  Tablet. 600 milliGRAM(s) Oral every 6 hours  influenza   Vaccine 0.5 milliLiter(s) IntraMuscular once  ketorolac   Injectable 30 milliGRAM(s) IV Push every 6 hours  lactated ringers. 1000 milliLiter(s) (125 mL/Hr) IV Continuous <Continuous>  lactated ringers. 1000 milliLiter(s) (75 mL/Hr) IV Continuous <Continuous>  oxytocin Infusion 333.333 milliUNIT(s)/Min (1000 mL/Hr) IV Continuous <Continuous>    MEDICATIONS  (PRN):  Nothing to do until 3.5 c,  dexAMETHasone  Injectable 4 milliGRAM(s) IV Push once PRN Nausea  diphenhydrAMINE 25 milliGRAM(s) Oral every 6 hours PRN Pruritus  lanolin Ointment 1 Application(s) Topical every 6 hours PRN Sore Nipples  magnesium hydroxide Suspension 30 milliLiter(s) Oral two times a day PRN Constipation  nalbuphine Injectable 2.5 milliGRAM(s) IV Push every 6 hours PRN Pruritus  naloxone Injectable 0.1 milliGRAM(s) IV Push every 3 minutes PRN For ANY of the following changes in patient status:  A. Breaths Per Minute LESS THAN 10, B. Oxygen saturation LESS THAN 90%, C. Sedation score of 6 for Stop After: 4 Times  ondansetron Injectable 4 milliGRAM(s) IV Push every 6 hours PRN Nausea  oxyCODONE    IR 5 milliGRAM(s) Oral every 3 hours PRN Mild Pain (1 - 3)  oxyCODONE    IR 10 milliGRAM(s) Oral every 3 hours PRN Moderate Pain (4 - 6)  oxyCODONE    IR 5 milliGRAM(s) Oral every 3 hours PRN Moderate to Severe Pain (4-10)  oxyCODONE    IR 5 milliGRAM(s) Oral once PRN Moderate to Severe Pain (4-10)  simethicone 80 milliGRAM(s) Chew every 4 hours PRN Gas      Labs:  Blood type: O Positive  Rubella IgG: RPR: Negative                          11.3<L>   8.88 >-----------< 196    ( 10-27 @ 06:20 )             32.7<L>                        12.6   6.68 >-----------< 224    ( 10-26 @ 09:44 )             36.2    10-26-21 @ 09:44      134<L>  |  102  |  10  ----------------------------<  76  3.5   |  17<L>  |  0.52        Ca    9.3      26 Oct 2021 09:44    TPro  6.2  /  Alb  3.7  /  TBili  0.7  /  DBili  x   /  AST  25  /  ALT  17  /  AlkPhos  119  10-26-21 @ 09:44          PE:  General: NAD, patient resting comfortably in bed  Abdomen: Mildly distended, appropriately tender  Incision: Clean, dry, intact. EDEN in place.  Extremities: SCDs in place, no erythema

## 2021-10-27 NOTE — PROGRESS NOTE ADULT - SUBJECTIVE AND OBJECTIVE BOX
Patient seen and examined at bedside, resting comfortably in no acute distress. Denies fever, chills, nausea or vomiting. She is tolerating a regular diet. She is ambulating without difficulty and voiding spontaneously. Pain is well controlled. Bleeding is less than a normal menses. Denies passing gas and has not yet had a bowel movement.    Physical Examination:  Vital Signs: Vital Signs Last 24 Hrs  T(C): 36.7 (27 Oct 2021 09:58), Max: 37.1 (27 Oct 2021 01:42)  T(F): 98 (27 Oct 2021 09:58), Max: 98.7 (27 Oct 2021 01:42)  HR: 86 (27 Oct 2021 09:58) (49 - 86)  BP: 113/65 (27 Oct 2021 09:58) (94/74 - 136/63)  BP(mean): 74 (26 Oct 2021 20:00) (67 - 89)  RR: 18 (27 Oct 2021 09:58) (14 - 22)  SpO2: 99% (27 Oct 2021 09:58) (97% - 100%)  General: alert and oriented, no acute distress  Cardio: regular rate and rhythm  Respiratory: non labored respirations  Abdomen: soft, non-tender, non-distended; bowel sounds present; uterus firm, palpated below the umbilicus; EDEN dressing clean, dry and intact, surrounding skin non erythematous  VE: minimal lochia noted  Musculoskeletal: No erythema/edema, no calf tenderness bilaterally    MEDICATIONS  (STANDING):  acetaminophen     Tablet .. 975 milliGRAM(s) Oral <User Schedule>  diphtheria/tetanus/pertussis (acellular) Vaccine (ADAcel) 0.5 milliLiter(s) IntraMuscular once  heparin   Injectable 5000 Unit(s) SubCutaneous every 12 hours  ibuprofen  Tablet. 600 milliGRAM(s) Oral every 6 hours  influenza   Vaccine 0.5 milliLiter(s) IntraMuscular once  ketorolac   Injectable 30 milliGRAM(s) IV Push every 6 hours  lactated ringers. 1000 milliLiter(s) (125 mL/Hr) IV Continuous <Continuous>  lactated ringers. 1000 milliLiter(s) (75 mL/Hr) IV Continuous <Continuous>  oxytocin Infusion 333.333 milliUNIT(s)/Min (1000 mL/Hr) IV Continuous <Continuous>      Labs:  Blood type: O Positive  Rubella IgG: RPR: Negative                          11.3<L>   8.88 >-----------< 196    ( 10-27 @ 06:20 )             32.7<L>                        12.6   6.68 >-----------< 224    ( 10-26 @ 09:44 )             36.2    10-26-21 @ 09:44      134<L>  |  102  |  10  ----------------------------<  76  3.5   |  17<L>  |  0.52        Ca    9.3      26 Oct 2021 09:44    TPro  6.2  /  Alb  3.7  /  TBili  0.7  /  DBili  x   /  AST  25  /  ALT  17  /  AlkPhos  119  10-26-21 @ 09:44      Assessment and Plan:   47 yo P2 s/p rLTCS and bilateral salpingectomy on 10/26 now POD#1 .  Patient is stable and doing well post-partum.   Blood type: O+    Plan:  - VS per unit protocol  - SCDs for DVT ppx  - Regular diet  - Pain well controlled, continue current pain regimen  - Encourage ambulation  - Continue wound care  - Baby boy for circumcision, cleared, consented  - Discharge instructions reviewed  - D/c planning initiated, patient to follow up in office in 6 weeks for post partum visit    Gigi Barriga MD

## 2021-10-28 RX ORDER — SENNA PLUS 8.6 MG/1
2 TABLET ORAL AT BEDTIME
Refills: 0 | Status: DISCONTINUED | OUTPATIENT
Start: 2021-10-28 | End: 2021-10-29

## 2021-10-28 RX ADMIN — Medication 975 MILLIGRAM(S): at 21:11

## 2021-10-28 RX ADMIN — Medication 975 MILLIGRAM(S): at 11:00

## 2021-10-28 RX ADMIN — Medication 600 MILLIGRAM(S): at 23:55

## 2021-10-28 RX ADMIN — MAGNESIUM HYDROXIDE 30 MILLILITER(S): 400 TABLET, CHEWABLE ORAL at 00:41

## 2021-10-28 RX ADMIN — Medication 10 MILLIGRAM(S): at 12:50

## 2021-10-28 RX ADMIN — Medication 975 MILLIGRAM(S): at 10:04

## 2021-10-28 RX ADMIN — SIMETHICONE 80 MILLIGRAM(S): 80 TABLET, CHEWABLE ORAL at 00:41

## 2021-10-28 RX ADMIN — HEPARIN SODIUM 5000 UNIT(S): 5000 INJECTION INTRAVENOUS; SUBCUTANEOUS at 10:04

## 2021-10-28 RX ADMIN — Medication 975 MILLIGRAM(S): at 20:41

## 2021-10-28 RX ADMIN — Medication 600 MILLIGRAM(S): at 12:50

## 2021-10-28 RX ADMIN — Medication 600 MILLIGRAM(S): at 19:09

## 2021-10-28 RX ADMIN — Medication 600 MILLIGRAM(S): at 13:50

## 2021-10-28 RX ADMIN — HEPARIN SODIUM 5000 UNIT(S): 5000 INJECTION INTRAVENOUS; SUBCUTANEOUS at 22:10

## 2021-10-28 RX ADMIN — MAGNESIUM HYDROXIDE 30 MILLILITER(S): 400 TABLET, CHEWABLE ORAL at 10:04

## 2021-10-28 RX ADMIN — Medication 600 MILLIGRAM(S): at 18:21

## 2021-10-28 RX ADMIN — SIMETHICONE 80 MILLIGRAM(S): 80 TABLET, CHEWABLE ORAL at 10:04

## 2021-10-28 NOTE — PROGRESS NOTE ADULT - SUBJECTIVE AND OBJECTIVE BOX
SUBJECTIVE:    Pain: Controlled    Complaints: None    MILESTONES:    Alert and Oriented x 3  [ x ]  Out of bed/ ambulating. [ x ]  Flatus:   Positive [   ]  Negative [ X ]  Bowel movement  [  ] Positive [  ] Negative   Voiding [x  ] Due to void [  ]   Linder/Indwelling catheter in place [  ]  Diet: Regular [ x ]  Clears [  ]  NPO [  ]    Infant feeding:  Breast [  ]   Bottle [  ]  Both [ X ]  Feeding related issues and/or concerns:      OBJECTIVE:  T(C): 37.2 (10-28-21 @ 05:41), Max: 37.2 (10-27-21 @ 22:33)  HR: 68 (10-28-21 @ 05:41) (64 - 68)  BP: 122/65 (10-28-21 @ 05:41) (115/66 - 122/65)  RR: 18 (10-28-21 @ 05:41) (16 - 18)  SpO2: 99% (10-28-21 @ 05:41) (98% - 100%)  Wt(kg): --                        11.3   8.88  )-----------( 196      ( 27 Oct 2021 06:20 )             32.7           Blood Type: O Positive    RPR: Negative          MEDICATIONS  (STANDING):  acetaminophen     Tablet .. 975 milliGRAM(s) Oral <User Schedule>  bisacodyl Suppository 10 milliGRAM(s) Rectal once  diphtheria/tetanus/pertussis (acellular) Vaccine (ADAcel) 0.5 milliLiter(s) IntraMuscular once  heparin   Injectable 5000 Unit(s) SubCutaneous every 12 hours  ibuprofen  Tablet. 600 milliGRAM(s) Oral every 6 hours  influenza   Vaccine 0.5 milliLiter(s) IntraMuscular once    MEDICATIONS  (PRN):  diphenhydrAMINE 25 milliGRAM(s) Oral every 6 hours PRN Pruritus  lanolin Ointment 1 Application(s) Topical every 6 hours PRN Sore Nipples  magnesium hydroxide Suspension 30 milliLiter(s) Oral two times a day PRN Constipation  ondansetron Injectable 4 milliGRAM(s) IV Push every 6 hours PRN Nausea  oxyCODONE    IR 5 milliGRAM(s) Oral every 3 hours PRN Moderate to Severe Pain (4-10)  oxyCODONE    IR 5 milliGRAM(s) Oral once PRN Moderate to Severe Pain (4-10)  simethicone 80 milliGRAM(s) Chew every 4 hours PRN Gas        ASSESSMENT:    46y     G  3    P         PO Day#  2        Delivery: Primary [  ]    Repeat [ X ]       QBL - 309                                  Indication of procedure: Scheduled, with Mehul Salpingectomy    Condition: Stable    Past Medical History significant for: HPI:  HPI: Pt is a 47 yo  @39+6 presenting for rLTCS and BS.  FM +  LOF denies   CTX denies   VB denies     Prenatal Issues: GDMA1  Antepartum admissions: -5/15 @16 weeks, for UTI with R CVA tenderness. Treated with ceftriaxone for UTI and terconazole yeast infection. Also found to be hypokalemic (3.1) and given K supplementation  GBS positive   EFW 3800g    OBHx:  G1: , SAB, D+C  G2: ,  due to NRFHT, 10#  G3: current     Gyn Hx:  denies     PMH:   denies     SHx:     cervical spine fusion C3-T2    Psych:   denies     Social:  denies     Medications: bASA, PNV    Allergies: none     Will Accept blood transfusion? yes     Vital Signs Last 24 Hrs  T(C): 36.9 (26 Oct 2021 09:36), Max: 36.9 (26 Oct 2021 09:20)  T(F): 98.4 (26 Oct 2021 09:36), Max: 98.42 (26 Oct 2021 09:20)  HR: 72 (26 Oct 2021 09:36) (72 - 72)  BP: 127/82 (26 Oct 2021 09:36) (127/82 - 127/82)  BP(mean): --  RR: 14 (26 Oct 2021 09:36) (14 - 14)  SpO2: --    Physical Exam:  Gen: NAD, AOx3  CV: RR  Resp: unlabored respirations  Abd: soft, NT, ND      FHT: 130s, moderate variability, accels, no decels   [place]: uterine irritability   EFW: 3800g  Sono: vertex, fundal      (26 Oct 2021 10:04)      Current Issues:    Breasts:  Soft [x  ]   Engorged [  ]  Nipples:  Abdomen: Soft [ x ]   Distended [  ] Nontender [  ]     Bowel sounds :  Present [ X ]  Absent [  ]   Fundus:  Firm [x  ]  Boggy [  ]    Abdominal incision: Clean, Dry and Intact [   ]  Staples [  ] Steri Strips [  ] Dermabond [  ] Sutures [  ]   EDEN [ X  ]    Patient wearing abdominal binder for support.    Vaginal: Lochia:  Heavy [  ]  Moderate [ x ]   Scant [  ]    Extremities: Edema [ X ] Negative Kvng's Sign [ X ] Nontender Mehul  [ x ] Positive pedal pulses [  ]    Other relevant physical exam findings:  EDEN in place and functioning.       PLAN:    Plan: Increase ambulation, analgesia PRN and pain medication protocol standing oxycodone, ibuprofen and acetaminophen.    Diet: Regular diet    Continue routine post-operative and postpartum care.  No Flatus yet. Just started burping. Tolerating PO food/fluids. No c/o Nausea or Vomiting.  Encourage ambulation.  MOM/Mylicon/Senna/Dulcolax Supp as needed.  Dr. Lee is aware.    Diabetes - For Repeat Glucose Testing in 6 weeks    Discharge Planning [ x ]    For discharge Today  [    ]    Consults:  Social Work [  ]  Lactation [ x ]  Other [         ]

## 2021-10-29 RX ORDER — SENNA PLUS 8.6 MG/1
1 TABLET ORAL
Refills: 0 | Status: DISCONTINUED | OUTPATIENT
Start: 2021-10-29 | End: 2021-10-30

## 2021-10-29 RX ADMIN — Medication 975 MILLIGRAM(S): at 21:10

## 2021-10-29 RX ADMIN — Medication 600 MILLIGRAM(S): at 06:23

## 2021-10-29 RX ADMIN — SIMETHICONE 80 MILLIGRAM(S): 80 TABLET, CHEWABLE ORAL at 10:04

## 2021-10-29 RX ADMIN — Medication 975 MILLIGRAM(S): at 15:31

## 2021-10-29 RX ADMIN — Medication 975 MILLIGRAM(S): at 04:01

## 2021-10-29 RX ADMIN — SIMETHICONE 80 MILLIGRAM(S): 80 TABLET, CHEWABLE ORAL at 15:32

## 2021-10-29 RX ADMIN — Medication 975 MILLIGRAM(S): at 20:40

## 2021-10-29 RX ADMIN — Medication 600 MILLIGRAM(S): at 11:39

## 2021-10-29 RX ADMIN — Medication 600 MILLIGRAM(S): at 00:25

## 2021-10-29 RX ADMIN — Medication 600 MILLIGRAM(S): at 05:53

## 2021-10-29 RX ADMIN — SENNA PLUS 1 TABLET(S): 8.6 TABLET ORAL at 15:29

## 2021-10-29 RX ADMIN — HEPARIN SODIUM 5000 UNIT(S): 5000 INJECTION INTRAVENOUS; SUBCUTANEOUS at 11:38

## 2021-10-29 RX ADMIN — Medication 975 MILLIGRAM(S): at 03:31

## 2021-10-29 RX ADMIN — HEPARIN SODIUM 5000 UNIT(S): 5000 INJECTION INTRAVENOUS; SUBCUTANEOUS at 20:41

## 2021-10-29 NOTE — LACTATION INITIAL EVALUATION - INTERVENTION OUTCOME
Recommended more breastfeeding and less formula feeding. Strategies reviewed on getting baby on breast more often. Reinforced importance of log and f/u appointments. Warm line information and encouraged to join  support group../verbalizes understanding/demonstrates understanding of teaching/good return demonstration/needs met

## 2021-10-29 NOTE — PROGRESS NOTE ADULT - SUBJECTIVE AND OBJECTIVE BOX
SUBJECTIVE:    Pain: Controlled    Complaints: None    MILESTONES:    Alert and Oriented x 3  [ x ]  Out of bed/ ambulating. [ x ]  Flatus:   Positive [ x ]  Negative [  ]  Bowel movement  [ X ] Positive [  ] Negative   Voiding [x  ] Due to void [  ]   Linder/Indwelling catheter in place [  ]  Diet: Regular [ x ]  Clears [  ]  NPO [  ]    Infant feeding:  Breast [  ]   Bottle [  ]  Both [ X ]  Feeding related issues and/or concerns:      OBJECTIVE:  T(C): 36.7 (10-29-21 @ 05:30), Max: 36.9 (10-28-21 @ 14:09)  HR: 69 (10-29-21 @ 05:30) (67 - 69)  BP: 114/65 (10-29-21 @ 05:30) (114/65 - 127/76)  RR: 18 (10-29-21 @ 05:30) (16 - 18)  SpO2: 100% (10-29-21 @ 05:30) (100% - 100%)  Wt(kg): --          Blood Type: O Positive    RPR: Negative          MEDICATIONS  (STANDING):  acetaminophen     Tablet .. 975 milliGRAM(s) Oral <User Schedule>  diphtheria/tetanus/pertussis (acellular) Vaccine (ADAcel) 0.5 milliLiter(s) IntraMuscular once  heparin   Injectable 5000 Unit(s) SubCutaneous every 12 hours  ibuprofen  Tablet. 600 milliGRAM(s) Oral every 6 hours    MEDICATIONS  (PRN):  diphenhydrAMINE 25 milliGRAM(s) Oral every 6 hours PRN Pruritus  lanolin Ointment 1 Application(s) Topical every 6 hours PRN Sore Nipples  magnesium hydroxide Suspension 30 milliLiter(s) Oral two times a day PRN Constipation  ondansetron Injectable 4 milliGRAM(s) IV Push every 6 hours PRN Nausea  oxyCODONE    IR 5 milliGRAM(s) Oral every 3 hours PRN Moderate to Severe Pain (4-10)  oxyCODONE    IR 5 milliGRAM(s) Oral once PRN Moderate to Severe Pain (4-10)  senna 1 Tablet(s) Oral two times a day PRN Constipation  simethicone 80 milliGRAM(s) Chew every 4 hours PRN Gas        ASSESSMENT:    46y     G  3    P  2012       PO Day#  3        Delivery: Primary [  ]    Repeat [ X ]       QBL - 309                                  Indication of procedure: Scheduled, with Mehul Salpingectomy    Condition: Stable    Past Medical History significant for: HPI:  HPI: Pt is a 47 yo  @39+6 presenting for rLTCS and BS.  FM +  LOF denies   CTX denies   VB denies     Prenatal Issues: GDMA1  Antepartum admissions: -5/15 @16 weeks, for UTI with R CVA tenderness. Treated with ceftriaxone for UTI and terconazole yeast infection. Also found to be hypokalemic (3.1) and given K supplementation  GBS positive   EFW 3800g    OBHx:  G1: , SAB, D+C  G2: ,  due to NRFHT, 10#  G3: current     Gyn Hx:  denies     PMH:   denies     SHx:     cervical spine fusion C3-T2    Psych:   denies     Social:  denies     Medications: bASA, PNV    Allergies: none     Will Accept blood transfusion? yes     Vital Signs Last 24 Hrs  T(C): 36.9 (26 Oct 2021 09:36), Max: 36.9 (26 Oct 2021 09:20)  T(F): 98.4 (26 Oct 2021 09:36), Max: 98.42 (26 Oct 2021 09:20)  HR: 72 (26 Oct 2021 09:36) (72 - 72)  BP: 127/82 (26 Oct 2021 09:36) (127/82 - 127/82)  BP(mean): --  RR: 14 (26 Oct 2021 09:36) (14 - 14)  SpO2: --    Physical Exam:  Gen: NAD, AOx3  CV: RR  Resp: unlabored respirations  Abd: soft, NT, ND      FHT: 130s, moderate variability, accels, no decels   [place]: uterine irritability   EFW: 3800g  Sono: vertex, fundal      (26 Oct 2021 10:04)      Current Issues:    Breasts:  Soft [x  ]   Engorged [  ]  Nipples:  Abdomen: Soft [ x ]   Distended [  ] Nontender [  ]     Bowel sounds :  Present [  ]  Absent [  ]   Fundus:  Firm [x  ]  Boggy [  ]    Abdominal incision: Clean, Dry and Intact [x  ]  Staples [  ] Steri Strips [  ] Dermabond [  ] Sutures [  ] EDEN [ X  ]    Patient wearing abdominal binder for support.    Vaginal: Lochia:  Heavy [  ]  Moderate [ x ]   Scant [  ]    Extremities: Edema [ X ] Negative Kvng's Sign [ X ] Nontender Mehul  [ x ] Positive pedal pulses [  ]    Other relevant physical exam findings: EDEN is In place and functioning. Will go to the office for removal sometime next week.      PLAN:    Plan: Increase ambulation, analgesia PRN and pain medication protocol standing oxycodone, ibuprofen and acetaminophen.    Diet: Regular diet    Continue routine post-operative and postpartum care.   Patient is passing flatus and was able to have a BM yesterday after the Dulcolax Supp. Would like to take Senna today, as she feels a bit constipated.  Ambulation/OOB  is encouraged.  Continue Mylicon/MOM/Senna as ordered.      Diabetes - For repeat Glucose Testing in 6 weeks    Discharge Planning [ x ]    For discharge Today  [  X  ]    Consults:  Social Work [  ]  Lactation [ x ]  Other [         ]

## 2021-10-29 NOTE — LACTATION INITIAL EVALUATION - LACTATION INTERVENTIONS
initiate/review safe skin-to-skin/initiate/review hand expression/initiate/review pumping guidelines and safe milk handling/initiate/review techniques for position and latch/review techniques to manage sore nipples/engorgement/initiate/review breast massage/compression/reviewed components of an effective feeding and at least 8 effective feedings per day required/reviewed importance of monitoring infant diapers, the breastfeeding log, and minimum output each day/reviewed risks of unnecessary formula supplementation/reviewed risks of artificial nipples/reviewed strategies to transition to breastfeeding only/reviewed benefits and recommendations for rooming in/reviewed feeding on demand/by cue at least 8 times a day/recommended follow-up with pediatrician within 24 hours of discharge

## 2021-10-30 VITALS
DIASTOLIC BLOOD PRESSURE: 82 MMHG | OXYGEN SATURATION: 100 % | RESPIRATION RATE: 16 BRPM | SYSTOLIC BLOOD PRESSURE: 134 MMHG | HEART RATE: 73 BPM | TEMPERATURE: 98 F

## 2021-10-30 RX ORDER — OXYCODONE HYDROCHLORIDE 5 MG/1
5 TABLET ORAL ONCE
Refills: 0 | Status: DISCONTINUED | OUTPATIENT
Start: 2021-10-30 | End: 2021-10-30

## 2021-10-30 RX ADMIN — Medication 975 MILLIGRAM(S): at 18:45

## 2021-10-30 RX ADMIN — Medication 600 MILLIGRAM(S): at 16:17

## 2021-10-30 RX ADMIN — OXYCODONE HYDROCHLORIDE 5 MILLIGRAM(S): 5 TABLET ORAL at 03:20

## 2021-10-30 RX ADMIN — Medication 600 MILLIGRAM(S): at 00:53

## 2021-10-30 RX ADMIN — Medication 975 MILLIGRAM(S): at 19:15

## 2021-10-30 RX ADMIN — OXYCODONE HYDROCHLORIDE 5 MILLIGRAM(S): 5 TABLET ORAL at 02:50

## 2021-10-30 RX ADMIN — Medication 600 MILLIGRAM(S): at 15:26

## 2021-10-30 RX ADMIN — Medication 600 MILLIGRAM(S): at 06:21

## 2021-10-30 RX ADMIN — Medication 600 MILLIGRAM(S): at 05:51

## 2021-10-30 RX ADMIN — HEPARIN SODIUM 5000 UNIT(S): 5000 INJECTION INTRAVENOUS; SUBCUTANEOUS at 15:26

## 2021-10-30 RX ADMIN — Medication 600 MILLIGRAM(S): at 00:23

## 2021-10-30 NOTE — PROGRESS NOTE ADULT - ATTENDING COMMENTS
Pt reports mild nausea and denies having flatus.  She denies vomiting  Incision--EDEN dressing intact and dry  Pt stable POD #3  Will evaluate in a few hrs for improvement in sx and possible d/c home
Att  Pt seen and examined by me today. I agree with findings, assessment and plan documented in NP note. Pt to ambulate in hallway. Pt to be evaluated on 10/29 for flatus and possible discharge home   Laura Lee MD
Att  Pt seen and examined by me today. I agree with findings, assessment and plan documented in resident note. Pt passing flatus and feeling better. Abdomen still distended but less so. Anticipate discharge tonight after Shabbas. Pt to ambulate more today and increase fiber intake at home.  Laura Lee MD

## 2021-10-30 NOTE — PROGRESS NOTE ADULT - ASSESSMENT
Assessment:   47yo now postpartum day 1 from a , recovering well.     Plan:   - Continue scheduled Ibuprofen and Acetaminophen for pain, Oxycodone available PRN for breakthrough pain.  - Increase ambulation, SCDs when not ambulating  - Continue regular diet    Amyeo Jereen, PGY-1    
A/P: 45yo POD #1 s/p LTCS.  Patient is stable and doing well post-operatively.    - Continue regular diet.  - Increase ambulation.  - d/c PCA and transition to PO pain medication with Tylenol, Motrin and Oxycodone PRN for pain control.    - DVT prophylaxis with Heparin 5000u BID    Amyeo Jereen PGY-1

## 2021-10-30 NOTE — PROGRESS NOTE ADULT - SUBJECTIVE AND OBJECTIVE BOX
OB Postpartum Progress Note: PPD #1     45yo now PPD #1 after  seen and examined at bedside, no acute overnight events. Patient denies current complaints, her pain is well controlled. States she is ambulating, voiding spontaneously, passing gas, and tolerating regular diet. Denies CP, SOB, N/V, HA, blurred vision, epigastric pain.    Vital Signs Last 24 Hours  T(C): 37.6 (10-29-21 @ 22:05), Max: 37.6 (10-29-21 @ 22:05)  HR: 62 (10-30-21 @ 04:37) (58 - 67)  BP: 120/70 (10-30-21 @ 04:37) (120/70 - 143/76)  RR: 18 (10-29-21 @ 22:05) (12 - 18)  SpO2: 100% (10-29-21 @ 22:05) (100% - 100%)    Physical Exam:  General: NAD, resting comfortably in bed   Abdomen: Soft, non-tender, non-distended, fundus firm  Extremities: Full ROM, moving all extremities spontaneously  Pelvic: Lochia wnl    Labs:    Blood Type: O Positive  Antibody Screen: Negative  RPR: Negative               11.3   8.88  )-----------( 196      ( 10-27 @ 06:20 )             32.7                12.6   6.68  )-----------( 224      ( 10-26 @ 09:44 )             36.2                12.9   6.69  )-----------( 248      ( 10-18 @ 21:19 )             37.6         MEDICATIONS  (STANDING):  acetaminophen     Tablet .. 975 milliGRAM(s) Oral <User Schedule>  diphtheria/tetanus/pertussis (acellular) Vaccine (ADAcel) 0.5 milliLiter(s) IntraMuscular once  heparin   Injectable 5000 Unit(s) SubCutaneous every 12 hours  ibuprofen  Tablet. 600 milliGRAM(s) Oral every 6 hours    MEDICATIONS  (PRN):  diphenhydrAMINE 25 milliGRAM(s) Oral every 6 hours PRN Pruritus  lanolin Ointment 1 Application(s) Topical every 6 hours PRN Sore Nipples  magnesium hydroxide Suspension 30 milliLiter(s) Oral two times a day PRN Constipation  ondansetron Injectable 4 milliGRAM(s) IV Push every 6 hours PRN Nausea  oxyCODONE    IR 5 milliGRAM(s) Oral every 3 hours PRN Moderate to Severe Pain (4-10)  oxyCODONE    IR 5 milliGRAM(s) Oral once PRN Moderate to Severe Pain (4-10)  senna 1 Tablet(s) Oral two times a day PRN Constipation  simethicone 80 milliGRAM(s) Chew every 4 hours PRN Gas

## 2021-11-02 LAB — SURGICAL PATHOLOGY STUDY: SIGNIFICANT CHANGE UP

## 2021-11-03 ENCOUNTER — APPOINTMENT (OUTPATIENT)
Dept: OBGYN | Facility: CLINIC | Age: 46
End: 2021-11-03
Payer: MEDICARE

## 2021-11-03 ENCOUNTER — INPATIENT (INPATIENT)
Facility: HOSPITAL | Age: 46
LOS: 2 days | Discharge: HOME | End: 2021-11-06
Attending: STUDENT IN AN ORGANIZED HEALTH CARE EDUCATION/TRAINING PROGRAM | Admitting: STUDENT IN AN ORGANIZED HEALTH CARE EDUCATION/TRAINING PROGRAM
Payer: MEDICARE

## 2021-11-03 ENCOUNTER — NON-APPOINTMENT (OUTPATIENT)
Age: 46
End: 2021-11-03

## 2021-11-03 VITALS — SYSTOLIC BLOOD PRESSURE: 125 MMHG | DIASTOLIC BLOOD PRESSURE: 73 MMHG | HEART RATE: 90 BPM | TEMPERATURE: 98 F

## 2021-11-03 VITALS — HEART RATE: 72 BPM | DIASTOLIC BLOOD PRESSURE: 83 MMHG | SYSTOLIC BLOOD PRESSURE: 155 MMHG | HEIGHT: 65 IN

## 2021-11-03 DIAGNOSIS — O14.90 UNSPECIFIED PRE-ECLAMPSIA, UNSPECIFIED TRIMESTER: ICD-10-CM

## 2021-11-03 DIAGNOSIS — M54.2 CERVICALGIA: Chronic | ICD-10-CM

## 2021-11-03 DIAGNOSIS — Z98.890 OTHER SPECIFIED POSTPROCEDURAL STATES: Chronic | ICD-10-CM

## 2021-11-03 DIAGNOSIS — Z98.891 HISTORY OF UTERINE SCAR FROM PREVIOUS SURGERY: Chronic | ICD-10-CM

## 2021-11-03 LAB
ALBUMIN SERPL ELPH-MCNC: 3.6 G/DL — SIGNIFICANT CHANGE UP (ref 3.3–5)
ALP SERPL-CCNC: 109 U/L — SIGNIFICANT CHANGE UP (ref 40–120)
ALT FLD-CCNC: 29 U/L — SIGNIFICANT CHANGE UP (ref 4–33)
ANION GAP SERPL CALC-SCNC: 11 MMOL/L — SIGNIFICANT CHANGE UP (ref 7–14)
APPEARANCE UR: CLEAR — SIGNIFICANT CHANGE UP
APTT BLD: 33.6 SEC — SIGNIFICANT CHANGE UP (ref 27–36.3)
AST SERPL-CCNC: 19 U/L — SIGNIFICANT CHANGE UP (ref 4–32)
BACTERIA # UR AUTO: NEGATIVE — SIGNIFICANT CHANGE UP
BASOPHILS # BLD AUTO: 0.03 K/UL — SIGNIFICANT CHANGE UP (ref 0–0.2)
BASOPHILS NFR BLD AUTO: 0.5 % — SIGNIFICANT CHANGE UP (ref 0–2)
BILIRUB SERPL-MCNC: 0.2 MG/DL — SIGNIFICANT CHANGE UP (ref 0.2–1.2)
BILIRUB UR-MCNC: NEGATIVE — SIGNIFICANT CHANGE UP
BUN SERPL-MCNC: 13 MG/DL — SIGNIFICANT CHANGE UP (ref 7–23)
CALCIUM SERPL-MCNC: 9.3 MG/DL — SIGNIFICANT CHANGE UP (ref 8.4–10.5)
CHLORIDE SERPL-SCNC: 106 MMOL/L — SIGNIFICANT CHANGE UP (ref 98–107)
CO2 SERPL-SCNC: 24 MMOL/L — SIGNIFICANT CHANGE UP (ref 22–31)
COLOR SPEC: SIGNIFICANT CHANGE UP
CREAT ?TM UR-MCNC: 86 MG/DL — SIGNIFICANT CHANGE UP
CREAT SERPL-MCNC: 0.63 MG/DL — SIGNIFICANT CHANGE UP (ref 0.5–1.3)
DIFF PNL FLD: NEGATIVE — SIGNIFICANT CHANGE UP
EOSINOPHIL # BLD AUTO: 0.12 K/UL — SIGNIFICANT CHANGE UP (ref 0–0.5)
EOSINOPHIL NFR BLD AUTO: 2.1 % — SIGNIFICANT CHANGE UP (ref 0–6)
EPI CELLS # UR: 0 /HPF — SIGNIFICANT CHANGE UP (ref 0–5)
FIBRINOGEN PPP-MCNC: 591 MG/DL — HIGH (ref 290–520)
GLUCOSE SERPL-MCNC: 82 MG/DL — SIGNIFICANT CHANGE UP (ref 70–99)
GLUCOSE UR QL: NEGATIVE — SIGNIFICANT CHANGE UP
HCT VFR BLD CALC: 38.9 % — SIGNIFICANT CHANGE UP (ref 34.5–45)
HGB BLD-MCNC: 12.5 G/DL — SIGNIFICANT CHANGE UP (ref 11.5–15.5)
HYALINE CASTS # UR AUTO: 1 /LPF — SIGNIFICANT CHANGE UP (ref 0–7)
IANC: 2.96 K/UL — SIGNIFICANT CHANGE UP (ref 1.5–8.5)
IMM GRANULOCYTES NFR BLD AUTO: 0.7 % — SIGNIFICANT CHANGE UP (ref 0–1.5)
INR BLD: 1.02 RATIO — SIGNIFICANT CHANGE UP (ref 0.88–1.16)
KETONES UR-MCNC: NEGATIVE — SIGNIFICANT CHANGE UP
LDH SERPL L TO P-CCNC: 233 U/L — HIGH (ref 135–225)
LEUKOCYTE ESTERASE UR-ACNC: NEGATIVE — SIGNIFICANT CHANGE UP
LYMPHOCYTES # BLD AUTO: 2.04 K/UL — SIGNIFICANT CHANGE UP (ref 1–3.3)
LYMPHOCYTES # BLD AUTO: 35.5 % — SIGNIFICANT CHANGE UP (ref 13–44)
MCHC RBC-ENTMCNC: 30.6 PG — SIGNIFICANT CHANGE UP (ref 27–34)
MCHC RBC-ENTMCNC: 32.1 GM/DL — SIGNIFICANT CHANGE UP (ref 32–36)
MCV RBC AUTO: 95.1 FL — SIGNIFICANT CHANGE UP (ref 80–100)
MONOCYTES # BLD AUTO: 0.56 K/UL — SIGNIFICANT CHANGE UP (ref 0–0.9)
MONOCYTES NFR BLD AUTO: 9.7 % — SIGNIFICANT CHANGE UP (ref 2–14)
NEUTROPHILS # BLD AUTO: 2.96 K/UL — SIGNIFICANT CHANGE UP (ref 1.8–7.4)
NEUTROPHILS NFR BLD AUTO: 51.5 % — SIGNIFICANT CHANGE UP (ref 43–77)
NITRITE UR-MCNC: NEGATIVE — SIGNIFICANT CHANGE UP
NRBC # BLD: 0 /100 WBCS — SIGNIFICANT CHANGE UP
NRBC # FLD: 0 K/UL — SIGNIFICANT CHANGE UP
PH UR: 7.5 — SIGNIFICANT CHANGE UP (ref 5–8)
PLATELET # BLD AUTO: 339 K/UL — SIGNIFICANT CHANGE UP (ref 150–400)
POTASSIUM SERPL-MCNC: 4.1 MMOL/L — SIGNIFICANT CHANGE UP (ref 3.5–5.3)
POTASSIUM SERPL-SCNC: 4.1 MMOL/L — SIGNIFICANT CHANGE UP (ref 3.5–5.3)
PROT ?TM UR-MCNC: 10 MG/DL — SIGNIFICANT CHANGE UP
PROT ?TM UR-MCNC: 10 MG/DL — SIGNIFICANT CHANGE UP
PROT SERPL-MCNC: 7 G/DL — SIGNIFICANT CHANGE UP (ref 6–8.3)
PROT UR-MCNC: ABNORMAL
PROT/CREAT UR-RTO: 0.1 RATIO — SIGNIFICANT CHANGE UP (ref 0–0.2)
PROTHROM AB SERPL-ACNC: 11.7 SEC — SIGNIFICANT CHANGE UP (ref 10.6–13.6)
RBC # BLD: 4.09 M/UL — SIGNIFICANT CHANGE UP (ref 3.8–5.2)
RBC # FLD: 13.2 % — SIGNIFICANT CHANGE UP (ref 10.3–14.5)
RBC CASTS # UR COMP ASSIST: 2 /HPF — SIGNIFICANT CHANGE UP (ref 0–4)
SODIUM SERPL-SCNC: 141 MMOL/L — SIGNIFICANT CHANGE UP (ref 135–145)
SP GR SPEC: 1.02 — SIGNIFICANT CHANGE UP (ref 1–1.05)
URATE SERPL-MCNC: 4.2 MG/DL — SIGNIFICANT CHANGE UP (ref 2.5–7)
UROBILINOGEN FLD QL: SIGNIFICANT CHANGE UP
WBC # BLD: 5.75 K/UL — SIGNIFICANT CHANGE UP (ref 3.8–10.5)
WBC # FLD AUTO: 5.75 K/UL — SIGNIFICANT CHANGE UP (ref 3.8–10.5)
WBC UR QL: 1 /HPF — SIGNIFICANT CHANGE UP (ref 0–5)

## 2021-11-03 PROCEDURE — 0503F POSTPARTUM CARE VISIT: CPT

## 2021-11-03 RX ORDER — INFLUENZA VIRUS VACCINE 15; 15; 15; 15 UG/.5ML; UG/.5ML; UG/.5ML; UG/.5ML
0.5 SUSPENSION INTRAMUSCULAR ONCE
Refills: 0 | Status: COMPLETED | OUTPATIENT
Start: 2021-11-03 | End: 2021-11-03

## 2021-11-03 RX ORDER — HYDRALAZINE HCL 50 MG
10 TABLET ORAL ONCE
Refills: 0 | Status: COMPLETED | OUTPATIENT
Start: 2021-11-03 | End: 2021-11-03

## 2021-11-03 RX ORDER — MAGNESIUM SULFATE 500 MG/ML
2 VIAL (ML) INJECTION
Qty: 40 | Refills: 0 | Status: DISCONTINUED | OUTPATIENT
Start: 2021-11-03 | End: 2021-11-05

## 2021-11-03 RX ORDER — MAGNESIUM SULFATE 500 MG/ML
2 VIAL (ML) INJECTION
Qty: 40 | Refills: 0 | Status: DISCONTINUED | OUTPATIENT
Start: 2021-11-03 | End: 2021-11-03

## 2021-11-03 RX ORDER — MAGNESIUM SULFATE 500 MG/ML
4 VIAL (ML) INJECTION ONCE
Refills: 0 | Status: COMPLETED | OUTPATIENT
Start: 2021-11-03 | End: 2021-11-03

## 2021-11-03 RX ORDER — ACETAMINOPHEN 500 MG
1000 TABLET ORAL ONCE
Refills: 0 | Status: COMPLETED | OUTPATIENT
Start: 2021-11-03 | End: 2021-11-03

## 2021-11-03 RX ORDER — NIFEDIPINE 30 MG
30 TABLET, EXTENDED RELEASE 24 HR ORAL ONCE
Refills: 0 | Status: COMPLETED | OUTPATIENT
Start: 2021-11-03 | End: 2021-11-03

## 2021-11-03 RX ORDER — DIPHENHYDRAMINE HCL 50 MG
25 CAPSULE ORAL ONCE
Refills: 0 | Status: COMPLETED | OUTPATIENT
Start: 2021-11-03 | End: 2021-11-03

## 2021-11-03 RX ORDER — HYDRALAZINE HCL 50 MG
5 TABLET ORAL ONCE
Refills: 0 | Status: COMPLETED | OUTPATIENT
Start: 2021-11-03 | End: 2021-11-03

## 2021-11-03 RX ORDER — METOCLOPRAMIDE HCL 10 MG
5 TABLET ORAL ONCE
Refills: 0 | Status: COMPLETED | OUTPATIENT
Start: 2021-11-03 | End: 2021-11-03

## 2021-11-03 RX ADMIN — Medication 1000 MILLIGRAM(S): at 22:08

## 2021-11-03 RX ADMIN — Medication 10 MILLIGRAM(S): at 22:18

## 2021-11-03 RX ADMIN — Medication 300 GRAM(S): at 22:21

## 2021-11-03 RX ADMIN — Medication 25 MILLIGRAM(S): at 22:08

## 2021-11-03 RX ADMIN — Medication 30 MILLIGRAM(S): at 22:18

## 2021-11-03 RX ADMIN — Medication 5 MILLIGRAM(S): at 22:02

## 2021-11-03 RX ADMIN — Medication 1000 MILLIGRAM(S): at 22:29

## 2021-11-03 RX ADMIN — Medication 5 MILLIGRAM(S): at 22:08

## 2021-11-03 RX ADMIN — Medication 50 GM/HR: at 22:41

## 2021-11-03 NOTE — H&P ADULT - NSHPPHYSICALEXAM_GEN_ALL_CORE
2140: 166/84, HR 66  2156: 161/80, HR 54  2213: 170/92, HR 81  2228: 121/64, HR 86  2232: 127/75, HR 88    Lungs: Anterior and posterior lung sounds clear upon auscultation   Cardiac: R/R/R  Abdomen: soft, non tender. incision remains clean dry and intact  DTR2+, peripheral edema 2+

## 2021-11-03 NOTE — H&P ADULT - PROBLEM SELECTOR PLAN 1
-Admit l&d. Routine labs  -HELLP labs pending  -Magnesium sulfate for neuro protection   -Procardia 30mg XL  -CT scan of head if headache does not resolved s/p Tylenol, Reglan and benadryl   -Covid 19 pending for patient   -Consents signed and witnessed at bedside

## 2021-11-03 NOTE — H&P ADULT - ASSESSMENT
@2156  /84, HR 66  Repeat /80, HR 54  Plan:  -Hydralazine 5mg IVP  -Tylenol 1000mg, Reglan 5mg IVP and Benadryl 25mg IVP for headache   -will continue to monitor  -Admit for pre-eclampsia with severe features     @2213  /92, HR 81mg  Plan:  -Hydralazine 10mg IVP       45 y/o pt  s/p repeat  on 10/26/21 presents with pre-eclampsia with severe features   d/w Dr Dowd  Plan:  -Admit l&d. Routine labs  -HELLP labs pending  -Magnesium sulfate for neuro protection   -Procardia 30mg XL  -CT scan of head if headache does not resolved s/p Tylenol, Reglan and benadryl   -Covid 19 pending for patient   -Consents signed and witnessed at bedside
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
## 2021-11-04 ENCOUNTER — NON-APPOINTMENT (OUTPATIENT)
Age: 46
End: 2021-11-04

## 2021-11-04 LAB
ALBUMIN SERPL ELPH-MCNC: 3.9 G/DL — SIGNIFICANT CHANGE UP (ref 3.3–5)
ALP SERPL-CCNC: 110 U/L — SIGNIFICANT CHANGE UP (ref 40–120)
ALT FLD-CCNC: 26 U/L — SIGNIFICANT CHANGE UP (ref 4–33)
ANION GAP SERPL CALC-SCNC: 13 MMOL/L — SIGNIFICANT CHANGE UP (ref 7–14)
APTT BLD: 24.3 SEC — LOW (ref 27–36.3)
APTT BLD: 32 SEC — SIGNIFICANT CHANGE UP (ref 27–36.3)
AST SERPL-CCNC: 18 U/L — SIGNIFICANT CHANGE UP (ref 4–32)
BASOPHILS # BLD AUTO: 0.03 K/UL — SIGNIFICANT CHANGE UP (ref 0–0.2)
BASOPHILS NFR BLD AUTO: 0.4 % — SIGNIFICANT CHANGE UP (ref 0–2)
BILIRUB SERPL-MCNC: 0.3 MG/DL — SIGNIFICANT CHANGE UP (ref 0.2–1.2)
BUN SERPL-MCNC: 10 MG/DL — SIGNIFICANT CHANGE UP (ref 7–23)
CALCIUM SERPL-MCNC: 7.8 MG/DL — LOW (ref 8.4–10.5)
CHLORIDE SERPL-SCNC: 105 MMOL/L — SIGNIFICANT CHANGE UP (ref 98–107)
CO2 SERPL-SCNC: 21 MMOL/L — LOW (ref 22–31)
CREAT SERPL-MCNC: 0.55 MG/DL — SIGNIFICANT CHANGE UP (ref 0.5–1.3)
EOSINOPHIL # BLD AUTO: 0.1 K/UL — SIGNIFICANT CHANGE UP (ref 0–0.5)
EOSINOPHIL NFR BLD AUTO: 1.4 % — SIGNIFICANT CHANGE UP (ref 0–6)
FIBRINOGEN PPP-MCNC: 580 MG/DL — HIGH (ref 290–520)
GLUCOSE SERPL-MCNC: 118 MG/DL — HIGH (ref 70–99)
HCT VFR BLD CALC: 38.8 % — SIGNIFICANT CHANGE UP (ref 34.5–45)
HCT VFR BLD CALC: 39.7 % — SIGNIFICANT CHANGE UP (ref 34.5–45)
HGB BLD-MCNC: 12.8 G/DL — SIGNIFICANT CHANGE UP (ref 11.5–15.5)
HGB BLD-MCNC: 13 G/DL — SIGNIFICANT CHANGE UP (ref 11.5–15.5)
IANC: 5.2 K/UL — SIGNIFICANT CHANGE UP (ref 1.5–8.5)
IMM GRANULOCYTES NFR BLD AUTO: 0.4 % — SIGNIFICANT CHANGE UP (ref 0–1.5)
INR BLD: 0.98 RATIO — SIGNIFICANT CHANGE UP (ref 0.88–1.16)
LDH SERPL L TO P-CCNC: 217 U/L — SIGNIFICANT CHANGE UP (ref 135–225)
LDH SERPL L TO P-CCNC: 234 U/L — HIGH (ref 135–225)
LYMPHOCYTES # BLD AUTO: 1.23 K/UL — SIGNIFICANT CHANGE UP (ref 1–3.3)
LYMPHOCYTES # BLD AUTO: 16.9 % — SIGNIFICANT CHANGE UP (ref 13–44)
MAGNESIUM SERPL-MCNC: 4.2 MG/DL — HIGH (ref 1.6–2.6)
MAGNESIUM SERPL-MCNC: 4.7 MG/DL — HIGH (ref 1.6–2.6)
MAGNESIUM SERPL-MCNC: 5.4 MG/DL — HIGH (ref 1.6–2.6)
MCHC RBC-ENTMCNC: 30.7 PG — SIGNIFICANT CHANGE UP (ref 27–34)
MCHC RBC-ENTMCNC: 30.8 PG — SIGNIFICANT CHANGE UP (ref 27–34)
MCHC RBC-ENTMCNC: 32.7 GM/DL — SIGNIFICANT CHANGE UP (ref 32–36)
MCHC RBC-ENTMCNC: 33 GM/DL — SIGNIFICANT CHANGE UP (ref 32–36)
MCV RBC AUTO: 93 FL — SIGNIFICANT CHANGE UP (ref 80–100)
MCV RBC AUTO: 94.1 FL — SIGNIFICANT CHANGE UP (ref 80–100)
MONOCYTES # BLD AUTO: 0.67 K/UL — SIGNIFICANT CHANGE UP (ref 0–0.9)
MONOCYTES NFR BLD AUTO: 9.2 % — SIGNIFICANT CHANGE UP (ref 2–14)
NEUTROPHILS # BLD AUTO: 5.2 K/UL — SIGNIFICANT CHANGE UP (ref 1.8–7.4)
NEUTROPHILS NFR BLD AUTO: 71.7 % — SIGNIFICANT CHANGE UP (ref 43–77)
NRBC # BLD: 0 /100 WBCS — SIGNIFICANT CHANGE UP
NRBC # BLD: 0 /100 WBCS — SIGNIFICANT CHANGE UP
NRBC # FLD: 0 K/UL — SIGNIFICANT CHANGE UP
NRBC # FLD: 0 K/UL — SIGNIFICANT CHANGE UP
PLATELET # BLD AUTO: 335 K/UL — SIGNIFICANT CHANGE UP (ref 150–400)
PLATELET # BLD AUTO: 372 K/UL — SIGNIFICANT CHANGE UP (ref 150–400)
POTASSIUM SERPL-MCNC: 3.8 MMOL/L — SIGNIFICANT CHANGE UP (ref 3.5–5.3)
POTASSIUM SERPL-SCNC: 3.8 MMOL/L — SIGNIFICANT CHANGE UP (ref 3.5–5.3)
PROT SERPL-MCNC: 7.1 G/DL — SIGNIFICANT CHANGE UP (ref 6–8.3)
PROTHROM AB SERPL-ACNC: 11.3 SEC — SIGNIFICANT CHANGE UP (ref 10.6–13.6)
RBC # BLD: 4.17 M/UL — SIGNIFICANT CHANGE UP (ref 3.8–5.2)
RBC # BLD: 4.22 M/UL — SIGNIFICANT CHANGE UP (ref 3.8–5.2)
RBC # FLD: 13.2 % — SIGNIFICANT CHANGE UP (ref 10.3–14.5)
RBC # FLD: 13.2 % — SIGNIFICANT CHANGE UP (ref 10.3–14.5)
RH IG SCN BLD-IMP: POSITIVE — SIGNIFICANT CHANGE UP
SARS-COV-2 RNA SPEC QL NAA+PROBE: SIGNIFICANT CHANGE UP
SODIUM SERPL-SCNC: 139 MMOL/L — SIGNIFICANT CHANGE UP (ref 135–145)
URATE SERPL-MCNC: 3.9 MG/DL — SIGNIFICANT CHANGE UP (ref 2.5–7)
URATE SERPL-MCNC: 4.1 MG/DL — SIGNIFICANT CHANGE UP (ref 2.5–7)
WBC # BLD: 4.84 K/UL — SIGNIFICANT CHANGE UP (ref 3.8–10.5)
WBC # BLD: 7.26 K/UL — SIGNIFICANT CHANGE UP (ref 3.8–10.5)
WBC # FLD AUTO: 4.84 K/UL — SIGNIFICANT CHANGE UP (ref 3.8–10.5)
WBC # FLD AUTO: 7.26 K/UL — SIGNIFICANT CHANGE UP (ref 3.8–10.5)

## 2021-11-04 PROCEDURE — 99231 SBSQ HOSP IP/OBS SF/LOW 25: CPT | Mod: 24

## 2021-11-04 RX ORDER — IBUPROFEN 200 MG
600 TABLET ORAL EVERY 6 HOURS
Refills: 0 | Status: DISCONTINUED | OUTPATIENT
Start: 2021-11-04 | End: 2021-11-06

## 2021-11-04 RX ORDER — METOCLOPRAMIDE HCL 10 MG
10 TABLET ORAL ONCE
Refills: 0 | Status: COMPLETED | OUTPATIENT
Start: 2021-11-04 | End: 2021-11-04

## 2021-11-04 RX ORDER — ACETAMINOPHEN 500 MG
975 TABLET ORAL ONCE
Refills: 0 | Status: COMPLETED | OUTPATIENT
Start: 2021-11-04 | End: 2021-11-04

## 2021-11-04 RX ORDER — ACETAMINOPHEN 500 MG
975 TABLET ORAL
Refills: 0 | Status: DISCONTINUED | OUTPATIENT
Start: 2021-11-04 | End: 2021-11-06

## 2021-11-04 RX ORDER — IBUPROFEN 200 MG
600 TABLET ORAL EVERY 6 HOURS
Refills: 0 | Status: COMPLETED | OUTPATIENT
Start: 2021-11-04 | End: 2022-10-03

## 2021-11-04 RX ORDER — NIFEDIPINE 30 MG
30 TABLET, EXTENDED RELEASE 24 HR ORAL DAILY
Refills: 0 | Status: DISCONTINUED | OUTPATIENT
Start: 2021-11-04 | End: 2021-11-04

## 2021-11-04 RX ORDER — DIPHENHYDRAMINE HCL 50 MG
50 CAPSULE ORAL ONCE
Refills: 0 | Status: COMPLETED | OUTPATIENT
Start: 2021-11-04 | End: 2021-11-04

## 2021-11-04 RX ORDER — SODIUM CHLORIDE 9 MG/ML
1000 INJECTION, SOLUTION INTRAVENOUS
Refills: 0 | Status: DISCONTINUED | OUTPATIENT
Start: 2021-11-04 | End: 2021-11-06

## 2021-11-04 RX ORDER — HEPARIN SODIUM 5000 [USP'U]/ML
5000 INJECTION INTRAVENOUS; SUBCUTANEOUS EVERY 12 HOURS
Refills: 0 | Status: DISCONTINUED | OUTPATIENT
Start: 2021-11-04 | End: 2021-11-06

## 2021-11-04 RX ORDER — NIFEDIPINE 30 MG
30 TABLET, EXTENDED RELEASE 24 HR ORAL DAILY
Refills: 0 | Status: DISCONTINUED | OUTPATIENT
Start: 2021-11-04 | End: 2021-11-06

## 2021-11-04 RX ORDER — ACETAMINOPHEN 500 MG
1000 TABLET ORAL EVERY 6 HOURS
Refills: 0 | Status: DISCONTINUED | OUTPATIENT
Start: 2021-11-04 | End: 2021-11-06

## 2021-11-04 RX ADMIN — Medication 50 GM/HR: at 09:03

## 2021-11-04 RX ADMIN — Medication 600 MILLIGRAM(S): at 18:07

## 2021-11-04 RX ADMIN — Medication 975 MILLIGRAM(S): at 10:25

## 2021-11-04 RX ADMIN — Medication 50 MILLIGRAM(S): at 14:12

## 2021-11-04 RX ADMIN — Medication 600 MILLIGRAM(S): at 11:25

## 2021-11-04 RX ADMIN — Medication 50 GM/HR: at 01:41

## 2021-11-04 RX ADMIN — Medication 10 MILLIGRAM(S): at 14:11

## 2021-11-04 RX ADMIN — Medication 975 MILLIGRAM(S): at 16:09

## 2021-11-04 RX ADMIN — Medication 975 MILLIGRAM(S): at 22:03

## 2021-11-04 RX ADMIN — Medication 975 MILLIGRAM(S): at 11:24

## 2021-11-04 RX ADMIN — SODIUM CHLORIDE 50 MILLILITER(S): 9 INJECTION, SOLUTION INTRAVENOUS at 00:35

## 2021-11-04 RX ADMIN — Medication 975 MILLIGRAM(S): at 17:09

## 2021-11-04 RX ADMIN — Medication 600 MILLIGRAM(S): at 19:07

## 2021-11-04 RX ADMIN — Medication 30 MILLIGRAM(S): at 22:01

## 2021-11-04 RX ADMIN — Medication 50 GM/HR: at 19:17

## 2021-11-04 RX ADMIN — Medication 975 MILLIGRAM(S): at 22:30

## 2021-11-04 RX ADMIN — HEPARIN SODIUM 5000 UNIT(S): 5000 INJECTION INTRAVENOUS; SUBCUTANEOUS at 18:15

## 2021-11-04 RX ADMIN — Medication 600 MILLIGRAM(S): at 12:25

## 2021-11-04 RX ADMIN — Medication 50 GM/HR: at 00:34

## 2021-11-04 NOTE — PROVIDER CONTACT NOTE (CRITICAL VALUE NOTIFICATION) - BACKGROUND
postpartum readmit from 11/3, came in with headache, blurry vision, elevated BP's and N/V. Patient started on magnesium sulfate therapy for PEC with severe features on 11/3 at 2221.

## 2021-11-05 LAB
A1C WITH ESTIMATED AVERAGE GLUCOSE RESULT: 5.9 % — HIGH (ref 4–5.6)
ALBUMIN SERPL ELPH-MCNC: 3.4 G/DL — SIGNIFICANT CHANGE UP (ref 3.3–5)
ALP SERPL-CCNC: 104 U/L — SIGNIFICANT CHANGE UP (ref 40–120)
ALT FLD-CCNC: 21 U/L — SIGNIFICANT CHANGE UP (ref 4–33)
ANION GAP SERPL CALC-SCNC: 11 MMOL/L — SIGNIFICANT CHANGE UP (ref 7–14)
APTT BLD: 33.3 SEC — SIGNIFICANT CHANGE UP (ref 27–36.3)
AST SERPL-CCNC: 13 U/L — SIGNIFICANT CHANGE UP (ref 4–32)
BASOPHILS # BLD AUTO: 0.03 K/UL — SIGNIFICANT CHANGE UP (ref 0–0.2)
BASOPHILS NFR BLD AUTO: 0.6 % — SIGNIFICANT CHANGE UP (ref 0–2)
BILIRUB SERPL-MCNC: 0.4 MG/DL — SIGNIFICANT CHANGE UP (ref 0.2–1.2)
BUN SERPL-MCNC: 9 MG/DL — SIGNIFICANT CHANGE UP (ref 7–23)
CALCIUM SERPL-MCNC: 7.4 MG/DL — LOW (ref 8.4–10.5)
CHLORIDE SERPL-SCNC: 107 MMOL/L — SIGNIFICANT CHANGE UP (ref 98–107)
CO2 SERPL-SCNC: 21 MMOL/L — LOW (ref 22–31)
CREAT SERPL-MCNC: 0.58 MG/DL — SIGNIFICANT CHANGE UP (ref 0.5–1.3)
CRP SERPL-MCNC: 34.3 MG/L — HIGH
EOSINOPHIL # BLD AUTO: 0.07 K/UL — SIGNIFICANT CHANGE UP (ref 0–0.5)
EOSINOPHIL NFR BLD AUTO: 1.5 % — SIGNIFICANT CHANGE UP (ref 0–6)
ERYTHROCYTE [SEDIMENTATION RATE] IN BLOOD: 28 MM/HR — HIGH (ref 4–25)
ESTIMATED AVERAGE GLUCOSE: 123 — SIGNIFICANT CHANGE UP
FIBRINOGEN PPP-MCNC: 621 MG/DL — HIGH (ref 290–520)
FOLATE SERPL-MCNC: 9.5 NG/ML — SIGNIFICANT CHANGE UP (ref 3.1–17.5)
GLUCOSE SERPL-MCNC: 86 MG/DL — SIGNIFICANT CHANGE UP (ref 70–99)
HCT VFR BLD CALC: 38.5 % — SIGNIFICANT CHANGE UP (ref 34.5–45)
HGB BLD-MCNC: 12.8 G/DL — SIGNIFICANT CHANGE UP (ref 11.5–15.5)
IANC: 2.57 K/UL — SIGNIFICANT CHANGE UP (ref 1.5–8.5)
IMM GRANULOCYTES NFR BLD AUTO: 0.4 % — SIGNIFICANT CHANGE UP (ref 0–1.5)
INR BLD: 1.05 RATIO — SIGNIFICANT CHANGE UP (ref 0.88–1.16)
LDH SERPL L TO P-CCNC: 205 U/L — SIGNIFICANT CHANGE UP (ref 135–225)
LYMPHOCYTES # BLD AUTO: 1.63 K/UL — SIGNIFICANT CHANGE UP (ref 1–3.3)
LYMPHOCYTES # BLD AUTO: 34.2 % — SIGNIFICANT CHANGE UP (ref 13–44)
MCHC RBC-ENTMCNC: 31.1 PG — SIGNIFICANT CHANGE UP (ref 27–34)
MCHC RBC-ENTMCNC: 33.2 GM/DL — SIGNIFICANT CHANGE UP (ref 32–36)
MCV RBC AUTO: 93.7 FL — SIGNIFICANT CHANGE UP (ref 80–100)
MONOCYTES # BLD AUTO: 0.44 K/UL — SIGNIFICANT CHANGE UP (ref 0–0.9)
MONOCYTES NFR BLD AUTO: 9.2 % — SIGNIFICANT CHANGE UP (ref 2–14)
NEUTROPHILS # BLD AUTO: 2.57 K/UL — SIGNIFICANT CHANGE UP (ref 1.8–7.4)
NEUTROPHILS NFR BLD AUTO: 54.1 % — SIGNIFICANT CHANGE UP (ref 43–77)
NRBC # BLD: 0 /100 WBCS — SIGNIFICANT CHANGE UP
NRBC # FLD: 0 K/UL — SIGNIFICANT CHANGE UP
PLATELET # BLD AUTO: 359 K/UL — SIGNIFICANT CHANGE UP (ref 150–400)
POTASSIUM SERPL-MCNC: 3.5 MMOL/L — SIGNIFICANT CHANGE UP (ref 3.5–5.3)
POTASSIUM SERPL-SCNC: 3.5 MMOL/L — SIGNIFICANT CHANGE UP (ref 3.5–5.3)
PROT SERPL-MCNC: 6.3 G/DL — SIGNIFICANT CHANGE UP (ref 6–8.3)
PROTHROM AB SERPL-ACNC: 12.1 SEC — SIGNIFICANT CHANGE UP (ref 10.6–13.6)
RBC # BLD: 4.11 M/UL — SIGNIFICANT CHANGE UP (ref 3.8–5.2)
RBC # FLD: 13.3 % — SIGNIFICANT CHANGE UP (ref 10.3–14.5)
SODIUM SERPL-SCNC: 139 MMOL/L — SIGNIFICANT CHANGE UP (ref 135–145)
TSH SERPL-MCNC: 1.56 UIU/ML — SIGNIFICANT CHANGE UP (ref 0.27–4.2)
URATE SERPL-MCNC: 3.9 MG/DL — SIGNIFICANT CHANGE UP (ref 2.5–7)
VIT B12 SERPL-MCNC: 770 PG/ML — SIGNIFICANT CHANGE UP (ref 200–900)
WBC # BLD: 4.76 K/UL — SIGNIFICANT CHANGE UP (ref 3.8–10.5)
WBC # FLD AUTO: 4.76 K/UL — SIGNIFICANT CHANGE UP (ref 3.8–10.5)

## 2021-11-05 PROCEDURE — 72125 CT NECK SPINE W/O DYE: CPT | Mod: 26

## 2021-11-05 PROCEDURE — 99231 SBSQ HOSP IP/OBS SF/LOW 25: CPT | Mod: 24

## 2021-11-05 RX ADMIN — Medication 600 MILLIGRAM(S): at 06:30

## 2021-11-05 RX ADMIN — HEPARIN SODIUM 5000 UNIT(S): 5000 INJECTION INTRAVENOUS; SUBCUTANEOUS at 06:04

## 2021-11-05 RX ADMIN — Medication 600 MILLIGRAM(S): at 06:04

## 2021-11-05 RX ADMIN — Medication 975 MILLIGRAM(S): at 14:30

## 2021-11-05 RX ADMIN — Medication 975 MILLIGRAM(S): at 13:42

## 2021-11-05 RX ADMIN — Medication 600 MILLIGRAM(S): at 18:25

## 2021-11-05 RX ADMIN — HEPARIN SODIUM 5000 UNIT(S): 5000 INJECTION INTRAVENOUS; SUBCUTANEOUS at 18:24

## 2021-11-05 RX ADMIN — Medication 30 MILLIGRAM(S): at 23:12
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
## 2021-11-05 NOTE — PROVIDER CONTACT NOTE (OTHER) - BACKGROUND
rpt C/S on 10/26/21
postpartum readmit for elevated blood pressure. s/p magnesium infusion from 11/4/21 @ 2130. PC ration 0.1.
Patient is a  from 10/26. Readmit for high blood pressures, headache, N/V and blurry vision.

## 2021-11-05 NOTE — PROVIDER CONTACT NOTE (OTHER) - ACTION/TREATMENT ORDERED:
As per 3rd year resident Luz Emanuel draw HELLP labs and will come assess patient at bedside.
patient being evaluated by Allegra Singh NP. OB Considering ordering a neurology consult.
Discontinue Magnesium Sulfate therapy/ HELLP labs in the AM

## 2021-11-05 NOTE — PROVIDER CONTACT NOTE (OTHER) - ASSESSMENT
Pt asymptomatic. Denies headache, dizziness, SOB, chest pain. RN will continue to assess
Patient most recent VS are T 36.6; B/P 130/80; RR 17; O2 sat 99%; HR 81. Patient is A&Ox4. Reflxes are WNL. Patient is voiding adequately. Lung sounds are clear. Patient complaining of on and off headache with no blurry vision.
patient denies visual disturbances, epigastric pain and headache. complains of tingling sensation in hands and feet.

## 2021-11-05 NOTE — CONSULT NOTE ADULT - ATTENDING COMMENTS
dos 11/6/21    this morning she says she feels better. she feels back to her baseline level of weakness.   she was alert, gave her history  no dysarthria  fluent  arms cannot rise above 90 degrees. slow opn/close hand mvm.ts  stands with support of IV pole. walks holding on to IV pole with her R arm. antalgic worse from R.\    ct c spine reviewed, nothing acute  since shes back to her baseline I don't think you need to pursue the mri c spine now  pls reconsult with new questions

## 2021-11-05 NOTE — CONSULT NOTE ADULT - ASSESSMENT
47 y/o RHD woman w/pmhx of chronic cervical myelopathy s/p cervical spine fusion C3-T2 presents with pre-eclampsia w/ severe features following scheduled  on 10/26/21 now c/o worsening and non-remitting numbness of and tingling of her b/l arms, feet, and hands. On exam, found to have b/l upper and lower extremity weakness as well as diminished sensation in her upper extremities (worse distally) as well as diminished vibratory sensation and proprioception in b/l feet leading to difficulty with ambulation and gait instability when standing. 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     Plan:   Recommend MRI cervical spine w/out contrast  Check Vit B1, B6, B12, folate, RPR, TSH, A1c, Vit E, homocysteine, methylmalonic acid, ESR, CRP.   Recommend PT eval.       47 y/o RHD woman w/pmhx of chronic cervical myelopathy s/p cervical spine fusion C3-T2 presents with pre-eclampsia w/ severe features following scheduled  on 10/26/21 now c/o worsening and non-remitting numbness of and tingling of her b/l arms, feet, and hands. On exam, found to have b/l upper and lower extremity weakness as well as diminished sensation in her upper extremities (worse distally) as well as diminished vibratory sensation and proprioception in b/l feet leading to difficulty with ambulation and gait instability when standing. 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     Recs:  [] MRI cervical spine w/out contrast  [] Check Vit B1, B6, B12, folate, RPR, TSH, A1c, Vit E, homocysteine, methylmalonic acid, ESR, CRP.   [] Recommend PT eval.   [] fall precautions    Case to be seen and discussed with neurology attending, Dr. Huber.

## 2021-11-05 NOTE — PROVIDER CONTACT NOTE (OTHER) - RECOMMENDATIONS
As per  Discontinue magnesium sulfate therapy now.  made aware. [facility] labs in the  am.
please come evaluate patient.

## 2021-11-05 NOTE — PROVIDER CONTACT NOTE (OTHER) - SITUATION
patient complaining of tingling sensation in hands and feet.
Patient is pp readmit for PEC. Patient is on Magnesium sulfate 2 grams until 2221pm 11/4. Patient is also on Procardia 30mg XL daily.
Postpartum readmit. admitted on 11/03/2021 for increase BPs

## 2021-11-05 NOTE — PROVIDER CONTACT NOTE (OTHER) - REASON
Patient complaint of upper right quadrant pain.
Discontinue of magnesium sulfate therapy
patient c/o tingling in upper and lower extremities

## 2021-11-06 ENCOUNTER — TRANSCRIPTION ENCOUNTER (OUTPATIENT)
Age: 46
End: 2021-11-06

## 2021-11-06 VITALS
SYSTOLIC BLOOD PRESSURE: 122 MMHG | OXYGEN SATURATION: 95 % | DIASTOLIC BLOOD PRESSURE: 84 MMHG | TEMPERATURE: 98 F | HEART RATE: 86 BPM | RESPIRATION RATE: 15 BRPM

## 2021-11-06 LAB
A1C WITH ESTIMATED AVERAGE GLUCOSE RESULT: 5.6 % — SIGNIFICANT CHANGE UP (ref 4–5.6)
CRP SERPL-MCNC: 26.8 MG/L — HIGH
ERYTHROCYTE [SEDIMENTATION RATE] IN BLOOD: 27 MM/HR — HIGH (ref 4–25)
ESTIMATED AVERAGE GLUCOSE: 114 — SIGNIFICANT CHANGE UP
FOLATE SERPL-MCNC: 10.8 NG/ML — SIGNIFICANT CHANGE UP (ref 3.1–17.5)
HCYS SERPL-MCNC: 7.5 UMOL/L — SIGNIFICANT CHANGE UP
T PALLIDUM AB TITR SER: NEGATIVE — SIGNIFICANT CHANGE UP
TSH SERPL-MCNC: 1.16 UIU/ML — SIGNIFICANT CHANGE UP (ref 0.27–4.2)
VIT B12 SERPL-MCNC: 717 PG/ML — SIGNIFICANT CHANGE UP (ref 200–900)

## 2021-11-06 PROCEDURE — 99238 HOSP IP/OBS DSCHRG MGMT 30/<: CPT | Mod: 24

## 2021-11-06 PROCEDURE — 99223 1ST HOSP IP/OBS HIGH 75: CPT

## 2021-11-06 RX ORDER — NIFEDIPINE 30 MG
1 TABLET, EXTENDED RELEASE 24 HR ORAL
Qty: 0 | Refills: 0 | DISCHARGE
Start: 2021-11-06

## 2021-11-06 RX ORDER — IBUPROFEN 200 MG
1 TABLET ORAL
Qty: 0 | Refills: 0 | DISCHARGE

## 2021-11-06 RX ORDER — ACETAMINOPHEN 500 MG
2 TABLET ORAL
Qty: 0 | Refills: 0 | DISCHARGE

## 2021-11-06 RX ADMIN — Medication 600 MILLIGRAM(S): at 12:56

## 2021-11-06 NOTE — PHYSICAL THERAPY INITIAL EVALUATION ADULT - ADDITIONAL COMMENTS
Pt will being staying with mother post discharge in a house with no stairs to negotiate. Prior to admission, pt ambulated independently. Was attending outpatient PT for LBP.     Pt was left semisupine with head of bed elevated to 30°, all lines intact and call bell within reach, RN aware.

## 2021-11-06 NOTE — DISCHARGE NOTE ANTEPARTUM - HOSPITAL COURSE
Patient re-admitted for pre-eclampsia with severe features  Patient received mag sulfate for 24 hours, started on Procardia 30mg XL  Patient c/o upper extremity numbness and tingling, h/o C-spine injury  Patient seen by neurology, recommendations appreciated. CT scan of head showed no abnormal findings. Per neuro recommendations, patient can follow up outpatient with neurology for further imaging (MRI) if needed)  Patient seen by physical therapy, cane given to assist in ambulation, can follow up outpatient physical therapy  Patient stable for discharge home, to continue Procardia 30mg XL daily, to follow up in office in 1 week for BP check

## 2021-11-06 NOTE — DISCHARGE NOTE ANTEPARTUM - CARE PROVIDER_API CALL
Kirill Reis)  OBSGYN  General  [facility], [address] [place] [address]  Phone: [phone]  Fax: [phone]  Follow Up Time:

## 2021-11-06 NOTE — PROGRESS NOTE ADULT - SUBJECTIVE AND OBJECTIVE BOX
**INCOMPLETE**    R3 Postpartum Note, HD#    Interval events: Patient seen and examined at bedside, no acute overnight events. No acute complaints. Pt reports +FM, denies LOF, VB, ctx, HA, epigastric pain, blurred vision, CP, SOB, N/V, fevers, or chills.    Vital Signs Last 24 Hours  T(C): 36.6 (11-04-21 @ 12:00), Max: 36.9 (11-03-21 @ 23:12)  HR: 81 (11-04-21 @ 12:00) (69 - 90)  BP: 130/80 (11-04-21 @ 12:00) (114/78 - 130/80)  RR: 17 (11-04-21 @ 12:00) (13 - 17)  SpO2: 99% (11-04-21 @ 12:00) (98% - 100%)      Physical Exam:  General: NAD  Abdomen: Soft, non-tender, gravid  Ext: No pain or swelling      Labs:             13.0   7.26  )-----------( 372      ( 11-04 @ 12:30 )             39.7     11-04 @ 12:30    139  |  105  |  10  ----------------------------<  118  3.8   |  21  |  0.55    Ca    7.8      11-04 @ 12:30  Mg     4.70     11-04 @ 12:06    TPro  7.1  /  Alb  3.9  /  TBili  0.3  /  DBili  x   /  AST  18  /  ALT  26  /  AlkPhos  110  11-04 @ 12:30    PT/INR - ( 11-04 @ 12:30 )   PT: 11.3 sec;   INR: 0.98 ratio    PTT - ( 11-04 @ 12:30 )  PTT:32.0 sec    Uric Acid: (11-04 @ 12:30)  3.9      Fibrinogen: (11-04 @ 12:30)  580      LDH: (11-04 @ 12:30)  234        MEDICATIONS  (STANDING):  acetaminophen     Tablet .. 975 milliGRAM(s) Oral <User Schedule>  acetaminophen     Tablet .. 1000 milliGRAM(s) Oral every 6 hours  ibuprofen  Tablet. 600 milliGRAM(s) Oral every 6 hours  influenza   Vaccine 0.5 milliLiter(s) IntraMuscular once  lactated ringers. 1000 milliLiter(s) (50 mL/Hr) IV Continuous <Continuous>  magnesium sulfate Infusion 2 Gm/Hr (50 mL/Hr) IV Continuous <Continuous>  magnesium sulfate Infusion 2 Gm/Hr (50 mL/Hr) IV Continuous <Continuous>  NIFEdipine XL 30 milliGRAM(s) Oral daily    MEDICATIONS  (PRN):  
OB Postpartum Note: Repeat  Delivery, POD#10    Interval events: patient states neuropathy in hands and feet improved since yesterday. States that cold packs alleviated foot discomfort. She denies HA, vision changes, CP, SOB, or epigastric pain.     O:   Vitals:  Vital Signs Last 24 Hrs  T(C): 36.7 (2021 06:58), Max: 37.1 (2021 09:22)  T(F): 98.1 (2021 06:58), Max: 98.8 (2021 09:22)  HR: 70 (2021 06:58) (70 - 89)  BP: 107/64 (2021 06:58) (107/64 - 123/66)  BP(mean): --  RR: 18 (2021 06:58) (18 - 18)  SpO2: 100% (2021 06:58) (100% - 100%)    MEDICATIONS  (STANDING):  acetaminophen     Tablet .. 1000 milliGRAM(s) Oral every 6 hours  acetaminophen     Tablet .. 975 milliGRAM(s) Oral <User Schedule>  heparin   Injectable 5000 Unit(s) SubCutaneous every 12 hours  ibuprofen  Tablet. 600 milliGRAM(s) Oral every 6 hours  influenza   Vaccine 0.5 milliLiter(s) IntraMuscular once  lactated ringers. 1000 milliLiter(s) (50 mL/Hr) IV Continuous <Continuous>  NIFEdipine XL 30 milliGRAM(s) Oral daily    MEDICATIONS  (PRN):      Labs:  Blood type: O Positive  Rubella IgG: RPR: Negative                          12.8   4.76 >-----------< 359    (  @ 07:19 )             38.5                        13.0   7.26 >-----------< 372    (  @ 12:30 )             39.7                        12.8   4.84 >-----------< 335    (  @ 06:58 )             38.8                        12.5   5.75 >-----------< 339    (  @ 22:36 )             38.9    21 @ 07:19      139  |  107  |  9   ----------------------------<  86  3.5   |  21<L>  |  0.58    21 @ 12:30      139  |  105  |  10  ----------------------------<  118<H>  3.8   |  21<L>  |  0.55    21 @ 22:36      141  |  106  |  13  ----------------------------<  82  4.1   |  24  |  0.63        Ca    7.4<L>      2021 07:19  Ca    7.8<L>      2021 12:30  Ca    9.3      2021 22:36  Mg     5.40<H>     11-04  Mg     4.70<H>     11-04  Mg     4.20<H>     11-04    TPro  6.3  /  Alb  3.4  /  TBili  0.4  /  DBili  x   /  AST  13  /  ALT  21  /  AlkPhos  104  21 @ 07:19  TPro  7.1  /  Alb  3.9  /  TBili  0.3  /  DBili  x   /  AST  18  /  ALT  26  /  AlkPhos  110  21 @ 12:30  TPro  7.0  /  Alb  3.6  /  TBili  0.2  /  DBili  x   /  AST  19  /  ALT  29  /  AlkPhos  109  21 @ 22:36      Physical Exam:  General: NAD  Abdomen: Soft, non-tender, no RUQ pain   Ext: No pain or swelling    
R3 Antepartum Note, HD#3 POD#10    Interval events: Patient seen and examined at bedside, no acute overnight events and pt reports headache resolved. This morning pt asymptomatic but when later evaluated by another provider pt reporting new onset neuropathy in hands and feet.  Pt deniesHA, epigastric pain, blurred vision, CP, SOB, N/V, fevers, or chills.    Vital Signs Last 24 Hours  T(C): 37.1 (11-05-21 @ 09:22), Max: 37.1 (11-05-21 @ 09:22)  HR: 82 (11-05-21 @ 09:22) (66 - 82)  BP: 109/76 (11-05-21 @ 09:22) (101/66 - 130/80)  RR: 18 (11-05-21 @ 09:22) (16 - 18)  SpO2: 100% (11-05-21 @ 09:22) (99% - 100%)        Physical Exam:  General: NAD  Abdomen: Soft, non-tender, no RUQ pain   Ext: No pain or swelling    Labs:             12.8   4.76  )-----------( 359      ( 11-05 @ 07:19 )             38.5     11-05 @ 07:19    139  |  107  |  9   ----------------------------<  86  3.5   |  21  |  0.58    Ca    7.4      11-05 @ 07:19  Mg     5.40     11-04 @ 17:39    TPro  6.3  /  Alb  3.4  /  TBili  0.4  /  DBili  x   /  AST  13  /  ALT  21  /  AlkPhos  104  11-05 @ 07:19    PT/INR - ( 11-05 @ 07:19 )   PT: 12.1 sec;   INR: 1.05 ratio    PTT - ( 11-05 @ 07:19 )  PTT:33.3 sec    Uric Acid: (11-05 @ 07:19)  3.9      Fibrinogen: (11-05 @ 07:19)  621      LDH: (11-05 @ 07:19)  205        MEDICATIONS  (STANDING):  acetaminophen     Tablet .. 975 milliGRAM(s) Oral <User Schedule>  acetaminophen     Tablet .. 1000 milliGRAM(s) Oral every 6 hours  heparin   Injectable 5000 Unit(s) SubCutaneous every 12 hours  ibuprofen  Tablet. 600 milliGRAM(s) Oral every 6 hours  influenza   Vaccine 0.5 milliLiter(s) IntraMuscular once  lactated ringers. 1000 milliLiter(s) (50 mL/Hr) IV Continuous <Continuous>  NIFEdipine XL 30 milliGRAM(s) Oral daily

## 2021-11-06 NOTE — PROGRESS NOTE ADULT - ATTENDING COMMENTS
Agree with above.  I evaluated pt at 815am when there were no reports of extremity numbness.  Pt reports no to minimal HA and no other c/o at that time.  Consider new sx possible side effect of new meds--ie Reglan, given overnight  Reviewed vital signs/blood pressures.  Pt cleared for d/c home on Procardia 30XL pending Neurology consult
Patient seen and examined at bedside, resting comfortably in no acute distress.  Denies HA, changes in vision, RUQ pain. BPs 100-120s/60s, currently on Procardia 30mg XL.  Reports the numbness in her fingers has significantly improved. Patient reports she is used to this sensation from her cervical myelopathy. She has an outpatient neurologist that she would like to follow up with. CT scan performed of head did not show any concerning findings.  Patient seen by PT, gave patient a can for ambulation, recommended outpatient PT. Referral for \[facility]\"" PT sent to patient.  Neurology also saw patient this morning. Said no need for inpatient MRI and she can follow up with neurology outpatient.  Patient is clear for discharge home, should continue BP monitoring at home and follow up in 1 week for BP check.    Gigi Barriga MD
Att:  Pt seen and examined by me today. I agree with findings, assessment and plan documented in resident note. Pt had 6/10 headache earlier today but at 9 pm, when pt seen again, she reports headache has completely resolved and she feels well. Bps stable. Will d/c mag sulfate.  Laura Lee MD

## 2021-11-06 NOTE — PHYSICAL THERAPY INITIAL EVALUATION ADULT - PERTINENT HX OF CURRENT PROBLEM, REHAB EVAL
Pt is a 46 year old female presenting s/p repeat scheduled  on 10/26/21 with complaints of elevated blood pressure. Also with c/o worsening bilateral UE/LE numbness tingling. PMH: chronic cervical myelopathy s/p cervical spine fusion C3-T2.

## 2021-11-06 NOTE — DISCHARGE NOTE ANTEPARTUM - MEDICATION SUMMARY - MEDICATIONS TO TAKE
I will START or STAY ON the medications listed below when I get home from the hospital:    NIFEdipine 30 mg oral tablet, extended release  -- 1 tab(s) by mouth once a day  -- Indication: For Hypertension

## 2021-11-06 NOTE — PROGRESS NOTE ADULT - ASSESSMENT
Ms. Edmonds is a 46y  admitted POD#10 status post rLTCS for sPEC.    #sPEC  - diagnosed by persistent neuro sx (HA & visual changes) and severe range BPs  - visual changes resolved   - headache improved with medication, will continue to monitor, if persistent CT head   - status post Hydralazine 5mg x1, 10mg x1 11/3  - current regimen: Nifedipine 30mg XL QD, BPs normotensive      #Maternal Status  - Reg diet  - HSQ, ambulation for DVT prophylaxis       Luz Emanuel MD  OBGYN PGY3   
46y  admitted POD#10 status post rLTCS for sPEC with elevated BPs and a headache. Patient clinically stable, with overall improving symptoms.     #sPEC  - Monitor BPs  - diagnosed by persistent neuro sx (HA & visual changes) and severe range BPs  - visual changes resolved   - headache improved with medication, will continue to monitor, if persistent CT head   - status post Hydralazine 5mg x1, 10mg x1 11/3  - current regimen: Nifedipine 30mg XL QD, BPs normotensive  - Neuro consulted yesterday for workup of bilateral peripheral neuropathy; will follow up AM labs. Plan for MRI c-spine, likely outpatient      #Maternal Status  - Reg diet  - HSQ, ambulation for DVT prophylaxis     RFrankel PGY3  
Ms. Edmonds is a 46y  admitted POD#9 status post rLTCS for sPEC.    #sPEC  - diagnosed by persistent neuro sx (HA & visual changes) and severe range BPs  - visual changes resolved   - headache improved with medication, will continue to monitor, if persistent CT head   - status post Hydralazine 5mg x1, 10mg x1 11/3  - current regimen: Nifedipine 30mg XL QD, BPs normotensive      #Maternal Status  - Reg diet  - HSQ, ambulation for DVT prophylaxis       Luz Emanuel MD  OBGYN PGY3

## 2021-11-06 NOTE — DISCHARGE NOTE ANTEPARTUM - ADDITIONAL INSTRUCTIONS
Patient to continue Procardia 30mg XL daily  Patient to follow up in office in 1 week for BP check  Patient to follow up with physical therapy outpatient  Patient to follow up outpatient with neurology

## 2021-11-06 NOTE — PHYSICAL THERAPY INITIAL EVALUATION ADULT - PATIENT PROFILE REVIEW, REHAB EVAL
PT orders received: no formal activity orders. Consult with RN Lina QUINONEZ, patient may participate in PT evaluation./yes

## 2021-11-06 NOTE — DISCHARGE NOTE ANTEPARTUM - PATIENT PORTAL LINK FT
You can access the FollowMyHealth Patient Portal offered by [facility] by registering at the following website: [email or web address]. By joining [employer]’s FollowMyHealth portal, you will also be able to view your health information using other applications (apps) compatible with our system.

## 2021-11-06 NOTE — DISCHARGE NOTE ANTEPARTUM - CARE PLAN
Principal Discharge DX:	Pre-eclampsia, postpartum  Assessment and plan of treatment:	Continue BP monitoring   1

## 2021-11-11 LAB
A-TOCOPHEROL VIT E SERPL-MCNC: 18.6 MG/L — SIGNIFICANT CHANGE UP (ref 7–25.1)
BETA+GAMMA TOCOPHEROL SERPL-MCNC: 1.4 MG/L — SIGNIFICANT CHANGE UP (ref 0.5–5.5)
METHYLMALONATE SERPL-SCNC: 51 NMOL/L — SIGNIFICANT CHANGE UP (ref 0–378)

## 2021-11-12 LAB — PYRIDOXAL PHOS SERPL-MCNC: 5.7 UG/L — SIGNIFICANT CHANGE UP (ref 2–32.8)

## 2021-11-17 ENCOUNTER — APPOINTMENT (OUTPATIENT)
Dept: OBGYN | Facility: CLINIC | Age: 46
End: 2021-11-17

## 2021-12-15 ENCOUNTER — APPOINTMENT (OUTPATIENT)
Dept: OBGYN | Facility: CLINIC | Age: 46
End: 2021-12-15
Payer: MEDICARE

## 2021-12-15 VITALS
DIASTOLIC BLOOD PRESSURE: 84 MMHG | BODY MASS INDEX: 32.82 KG/M2 | WEIGHT: 197 LBS | HEART RATE: 71 BPM | HEIGHT: 65 IN | SYSTOLIC BLOOD PRESSURE: 117 MMHG

## 2021-12-15 PROCEDURE — 0503F POSTPARTUM CARE VISIT: CPT

## 2021-12-18 NOTE — HISTORY OF PRESENT ILLNESS
[Postpartum Follow Up] : postpartum follow up [Repeat C/S] : delivered by  section (repeat) [Male] : Delivery History: baby boy [ Section] :  section [Delivery Date Was ___] : delivery date was [unfilled] [Boy] : baby is a boy [Circumcised] : circumcised [Living at Home] : is currently living at home [Breastfeeding] : currently nursing [Intended Contraception] : Intended Contraception: [Tubal Ligation] : tubal ligation [Clean/Dry/Intact] : clean, dry and intact [S/Sx PP Depression] : signs/symptoms of postpartum depression [[place] Depression Scale ___ (0-30)] : [unfilled] [(0) As much as I always could] : (0) No, not at all [(0) No, not at all] : (0) No, not at all [Healed] : healed [Back to Normal] : is back to normal in size [Examination Of The Breasts] : breasts are normal [Doing Well] : is doing well [No Sign of Infection] : is showing no signs of infection [Excellent Pain Control] : has excellent pain control [None] : None [Complications:___] : no complications [BF with Difficulty] : nursing without difficulty [Erythema] : not erythematous [Swelling] : not swollen [Dehiscence] : not dehisced [de-identified] : ELBA [de-identified] : Cervix and incision healed.  [de-identified] : rto for annual

## 2022-02-28 NOTE — PHYSICAL THERAPY INITIAL EVALUATION ADULT - LEVEL OF INDEPENDENCE: SIT/STAND, REHAB EVAL
PT Discharge    Today's date: 2022  Patient name: Peri Stewart  : 1964  MRN: [id number]  Referring provider: Valeria Nguyen MD  Dx:   Encounter Diagnosis     ICD-10-CM    1  Primary osteoarthritis of left knee  M17 12 Ambulatory referral to Physical Therapy     PT plan of care cert/re-cert       Start Time: 2842  Stop Time: 1710  Total time in clinic (min): 55 minutes    Assessment  Assessment details: The patient had his PT IE on 21 and he was seen in PT for this visit only  He did not return for more treatment  Unable to assess his current status, refer to his PT IE for his final assessment  He did not meet his goals secondary to being seen for his IE only  Unable to keep patient on hold, D/C PT secondary to script   D/C PT  Impairments: abnormal gait, abnormal or restricted ROM, activity intolerance, impaired physical strength, lacks appropriate home exercise program and pain with function  Other impairment: decreased flexibility  Functional limitations: difficulty with stair negotiationUnderstanding of Dx/Px/POC: good   Prognosis: good    Goals  STGs:  1  Initiate and complete HEP with verbal cues  - not met  2  Decrease L knee pain by > 25% in 4 weeks  - not met  3  Improve L knee ROM by 5-10 degrees in 4 weeks  - not met  4  Improve LLE strength by 1/2 grade in 4 weeks  - not met  LTGs:  1  Patient to be I with HEP in 12 weeks  - not met  2  Improve L knee ROM to 0-125 degrees in 12 weeks to improve function  - not met  3  Improve LLE strength to 5/5 t/o in 12 weeks to improve function  - not met  4  Decrease L knee pain to < or = to 3-4/10 with activity in 12 weeks to improve function  - not met  5  Patient to ambulate with normalized gait pattern in 12 weeks  - not met  6  Stair negotiation is improved to PLOF in 12 weeks  - not met  7  Recreational performance is improved to PLOF in 12 weeks  - not met  8  ADL performance is improved to PLOF in 12 weeks   - not met  9  Work performance is improved to maximal level of function in 12 weeks  - not met      Plan  Plan details: Unable to keep patient on hold, D/C PT secondary to script   D/C PT  Planned modality interventions: cryotherapy and thermotherapy: hydrocollator packs  Planned therapy interventions: manual therapy, balance, balance/weight bearing training, neuromuscular re-education, patient education, postural training, self care, strengthening, stretching, therapeutic activities, therapeutic exercise, flexibility, gait training and home exercise program        Subjective Evaluation    History of Present Illness  Mechanism of injury: The patient states that he has had L knee pain for > 6 months  He notes that this pain started with no known cause  He does report h/o L knee scope to repair the meniscus  He does have history of prior cortisone shots and chicken shots  He had been off work and his knee had been feeling better while he was off  When he returned to work he had increased pain in his knee  He had seen the doctor most recently last week  He had his knee drained and he was given another cortisone shot  He notes he had relief for about one day then pain returned  He did have an x-ray completed, per patient it showed arthritis  He was referred to OPPT and he now presents for his evaluation  He will be going back to see the doctor on 21 for his next appointment      Quality of life: good    Pain  At best pain ratin  At worst pain rating: 10  Location: L Knee - deeper in joint   Quality: throbbing, burning and dull ache  Relieving factors: medications and ice  Aggravating factors: stair climbing    Social Support  Steps to enter house: yes  Stairs in house: yes   Lives in: multiple-level home    Employment status: working (Full time construction )    Diagnostic Tests  X-ray: abnormal (Per pt, Arthritis )  Treatments  Previous treatment: injection treatment  Patient Goals  Patient goals for therapy: increased motion, decreased pain and increased strength  Patient goal: "To help strengthen my leg "          Objective     Tenderness   Left Knee   Tenderness in the lateral joint line  No tenderness in the lateral patella, medial joint line, medial patella and popliteal fossa  Neurological Testing     Sensation     Knee   Left Knee   Intact: light touch    Right Knee   Intact: light touch     Active Range of Motion   Left Knee   Flexion: 110 degrees with pain  Extension: 0 degrees with pain    Right Knee   Flexion: 125 degrees   Extension: 0 degrees     Additional Active Range of Motion Details  L SLR: 45  R SLR:  55    Mobility   Patellar Mobility:   Left Knee   WFL: medial and lateral      Right Knee   WFL: medial and lateral    Strength/Myotome Testing     Left Knee   Flexion: 4-  Extension: 4  Quadriceps contraction: fair    Right Knee   Quadriceps contraction: good    Ambulation   Weight-Bearing Status   Assistive device used: none    Ambulation: Level Surfaces   Ambulation without assistive device: independent    Ambulation: Stairs   Ascend stairs: independent  Pattern: reciprocal  Descend stairs: independent  Pattern: non-reciprocal    Additional Stairs Ambulation Details  Going down harder than going up steps  Observational Gait   Decreased walking speed and left stance time  contact guard

## 2022-03-31 ENCOUNTER — APPOINTMENT (OUTPATIENT)
Dept: OBGYN | Facility: CLINIC | Age: 47
End: 2022-03-31
Payer: MEDICARE

## 2022-03-31 PROCEDURE — XXXXX: CPT

## 2022-04-01 LAB
HCT VFR BLD CALC: 39.2 %
HGB BLD-MCNC: 12.2 G/DL

## 2022-05-15 ENCOUNTER — LABORATORY RESULT (OUTPATIENT)
Age: 47
End: 2022-05-15

## 2022-05-15 ENCOUNTER — APPOINTMENT (OUTPATIENT)
Dept: OBGYN | Facility: CLINIC | Age: 47
End: 2022-05-15
Payer: MEDICARE

## 2022-05-15 VITALS
DIASTOLIC BLOOD PRESSURE: 80 MMHG | BODY MASS INDEX: 33.32 KG/M2 | HEIGHT: 65 IN | HEART RATE: 69 BPM | SYSTOLIC BLOOD PRESSURE: 111 MMHG | WEIGHT: 200 LBS

## 2022-05-15 DIAGNOSIS — N92.6 IRREGULAR MENSTRUATION, UNSPECIFIED: ICD-10-CM

## 2022-05-15 DIAGNOSIS — Z01.419 ENCOUNTER FOR GYNECOLOGICAL EXAMINATION (GENERAL) (ROUTINE) W/OUT ABNORMAL FINDINGS: ICD-10-CM

## 2022-05-15 PROCEDURE — 36415 COLL VENOUS BLD VENIPUNCTURE: CPT

## 2022-05-15 PROCEDURE — 99396 PREV VISIT EST AGE 40-64: CPT

## 2022-05-15 NOTE — PHYSICAL EXAM
[Chaperone Present] : A chaperone was present in the examining room during all aspects of the physical examination [Appropriately responsive] : appropriately responsive [Alert] : alert [No Acute Distress] : no acute distress [No Lymphadenopathy] : no lymphadenopathy [No Murmurs] : no murmurs [Regular Rate Rhythm] : regular rate rhythm [Clear to Auscultation B/L] : clear to auscultation bilaterally [Soft] : soft [Non-tender] : non-tender [Non-distended] : non-distended [No HSM] : No HSM [No Lesions] : no lesions [No Mass] : no mass [Oriented x3] : oriented x3 [Examination Of The Breasts] : a normal appearance [No Masses] : no breast masses were palpable [Labia Minora] : normal [Labia Majora] : normal [Normal] : normal [Uterine Adnexae] : normal

## 2022-05-15 NOTE — PLAN
[FreeTextEntry1] : NAY is a 47 year year old P2 presenting for well woman exam. Sexually active, monogamous with 1 male partner. \par \par \par HCM\par pap/hpv\par mammo\par \par discussed causes of abnormal uterine bleeding including but not limited to fibroids, polyps, adenomyosis, endometriosis, malignancy, coagulation abnormalities, anovulation, pcos, hormonal dysfunction, weight gain/loss and stress\par will draw bloodwork including hormonal panel today\par patient to get pelvic sono and to return to office to discuss results

## 2022-05-15 NOTE — HISTORY OF PRESENT ILLNESS
[FreeTextEntry1] : 48 y/o female here for annual exam \par no complaints\par no menses since delivery

## 2022-05-17 ENCOUNTER — NON-APPOINTMENT (OUTPATIENT)
Age: 47
End: 2022-05-17

## 2022-05-17 LAB
ESTIMATED AVERAGE GLUCOSE: 137 MG/DL
ESTRADIOL SERPL-MCNC: 83 PG/ML
FSH SERPL-MCNC: 17 IU/L
HBA1C MFR BLD HPLC: 6.4 %
HCG SERPL-MCNC: <1 MIU/ML
HPV HIGH+LOW RISK DNA PNL CVX: NOT DETECTED
INSULIN SERPL-MCNC: 5.4 UU/ML
LH SERPL-ACNC: 29.7 IU/L
PROLACTIN SERPL-MCNC: 17.3 NG/ML
T3FREE SERPL-MCNC: 2.71 PG/ML
T4 FREE SERPL-MCNC: 1.1 NG/DL
TSH SERPL-ACNC: 2.28 UIU/ML

## 2022-05-18 LAB
CYTOLOGY CVX/VAG DOC THIN PREP: NORMAL
TESTOST FREE SERPL-MCNC: 1.1 PG/ML
TESTOST SERPL-MCNC: 4.7 NG/DL

## 2022-05-21 LAB
ANTI-MUELLERIAN HORMONE: <0.015 NG/ML
DHEA-SULFATE, SERUM: 45 UG/DL

## 2022-08-17 NOTE — ED PROVIDER NOTE - IV ALTEPASE ADMIN HIDDEN
show Birth Control Pills Pregnancy And Lactation Text: This medication should be avoided if pregnant and for the first 30 days post-partum.

## 2022-11-23 NOTE — OB RN PATIENT PROFILE - AS SC BRADEN SENSORY
PROGRESS NOTE    Name: Ana Lopez  Age: 40 year old  Gender: female      Chief Complaint   Patient presents with   • Office Visit   • New Patient   • Establish Care     Right shoulder surgery on 12/23/22 with Dr Haque at [facility]   • Pre-Op Exam     A 40-year-old female presents for an establishment of care.    Patient has given consent to record this visit for documentation in their clinical record.    HPI       CC:   Dr. Dieter Haque    Historian: self.    1. Encounter to establish care: Denies allergies and smoking history. Drinks socially. No drugs. No history of C- sections.     2. Preop examination: The 40-year-old patient presents for medical consult for surgical clearance at the request of physician Dr. Dieter Haque.  Scheduled for right shoulder surgery scheduled on 12/03/2022.  No chronic health issues. Blood pressure is normal.     3. 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 Reports severe inflammation of right shoulder. Had cortisone shots and exercises for pain; however, did not help her.     4. Epistaxis: Had history of epistaxis and never visited ENT.    5. Class 1 obesity due to excess calories without serious comorbidity with body mass index (BMI) of 30.0 to 30.9 in adult: Had history of obesity. Exercises for 5 days in week.    6. Need for COVID-19 vaccine: Due.    7. History of DVT (deep vein thrombosis): She had history of DVT in leg.    Additional comments:    Cervical cancer screening: PAP test was done on October 14 reveled no evidence of cancer and HPV.      CC:  Dr. Dieter Bellamy      Prior Anesthesia: No    Angina No  Arrhythmia No  CAD No  CAD w/Prior MI No  CAD w/Recent PCI No  CHF No    Chronic Liver Disease No  Acute Hepatitis No    Coagulation Delay No  Primary Hypercoagulable State No  Secondary Hypercoagulable State No  PE No  DVT Yes  Uses Anticoagulants No    Diabetes No  Uses Insulin No  Thyroid Disease No    Neck Osteoarthrosis  No  TMJ Osteoarthrosis No  Wears Dentures No    Seizure Disorder No  CVA No      Asthma No  COPD No  TAMIKO No    Renal Disease No          No current outpatient medications on file.     No current facility-administered medications for this visit.       ALLERGIES:  Patient has no known allergies.    History reviewed. No pertinent past medical history.    Past Surgical History:   Procedure Laterality Date   •  delivery+postpartum care          Family History   Problem Relation Age of Onset   • Patient is unaware of any medical problems Mother    • Patient is unaware of any medical problems Father        Social History     Socioeconomic History   • Marital status: /Civil Union     Spouse name: Not on file   • Number of children: Not on file   • Years of education: Not on file   • Highest education level: Not on file   Occupational History   • Not on file   Tobacco Use   • Smoking status: Never Smoker   • Smokeless tobacco: Never Used   Vaping Use   • Vaping Use: never used   Substance and Sexual Activity   • Alcohol use: Yes     Comment: socialy   • Drug use: Never   • Sexual activity: Yes   Other Topics Concern   • Not on file   Social History Narrative   • Not on file     Social Determinants of Health     Financial Resource Strain: Not on file   Food Insecurity: Not on file   Transportation Needs: Not on file   Physical Activity: Not on file   Stress: Not on file   Social Connections: Not on file   Intimate Partner Violence: Not on file     Review of Systems   Constitutional: As Per HPI.  Respiratory: As Per HPI.  Cardiovascular: As Per HPI.  Musculoskeletal: As Per HPI.  Skin: As Per HPI.  Allergic/Immunologic: As Per HPI.  Hematological: As Per HPI.     /76   Pulse 76   Temp 98.6 °F (37 °C)   Resp 18   Ht 5' 7\" (1.702 m)   Wt 88 kg (194 lb)   LMP 2022   BMI 30.38 kg/m²   BSA 2 m²   Physical Exam  Constitutional:       Appearance: She is well-developed.   HENT:      Head:  Normocephalic and atraumatic.      Right Ear: Tympanic membrane, ear canal and external ear normal.      Left Ear: Tympanic membrane, ear canal and external ear normal.      Nose: Nose normal.      Mouth/Throat:      Mouth: Mucous membranes are moist.      Pharynx: Oropharynx is clear.      Neck: Neck supple.   Eyes:      Conjunctiva/sclera: Conjunctivae normal.   Cardiovascular:      Rate and Rhythm: Normal rate and regular rhythm.      Heart sounds: Normal heart sounds.   Pulmonary:      Effort: Pulmonary effort is normal.      Breath sounds: Normal breath sounds. No wheezing.   Skin:     General: Skin is warm and dry.   Neurological:      Mental Status: She is alert.      Cranial Nerves: No cranial nerve deficit.   Psychiatric:         Mood and Affect: Mood normal.         Behavior: Behavior normal.       Assessment and Plan  Encounter to establish care  - Personal, medical History reviewed and discussed.    Preop examination  - Advised stopping supplements one week before the surgery.  -Normal CBC and CMP  -Patient is medically cleared for surgery  - CBC WITH DIFFERENTIAL; Future  - COMPREHENSIVE METABOLIC PANEL; Future    Calcific tendonitis  - Informed preoperative clearance will be provided after the test results.  - CBC WITH DIFFERENTIAL; Future  - COMPREHENSIVE METABOLIC PANEL; Future    Epistaxis  - Advised to consult ENT for further evaluation.  - SERVICE TO ENT    Class 1 obesity due to excess calories without serious comorbidity with body mass index (BMI) of 30.0 to 30.9 in adult  - Recommended exercising five hours for a week.   - Advised dietary modifications.    Need for COVID-19 vaccine  - Recommended completing the new booster shot.  - Explained the effectiveness of the new booster shot.  - Recommended scheduling an appointment at the COVID-19 vaccine clinic.    History of DVT (deep vein thrombosis)  - Advised to inform the surgeon and anesthesia specialist about her DVT.    Additional plan  details:    Cervical cancer screening:  - Reviewed and discussed previous Pap smear report.  - Informed her next Pap will be around October 2026.    Follow up appointment:  1 month post-op    Refer to orders.  Medical compliance with plan discussed and risks of non-compliance reviewed.  Patient education completed on disease process, etiology & prognosis.  Proper usage and side effects of medications reviewed & discussed.  Return to clinic as clinically indicated as discussed with patient who verbalized understanding of the plan and is in agreement with the plan.      I,  Dr. Teo Patel, have created a visit summary document based on the audio recording between Dr. Mercedes Sargent DO and this patient for the physician to review, edit as needed, and authenticate.  Creation Date: 11/25/2022        I have reviewed and edited the visit summary above and attest that it is accurate.    CC:  Dr. Dieter Bellamy- Preop testing and surgical clearance was faxed on November 25, 2022.     (4) no impairment

## 2023-01-02 NOTE — ED PROVIDER NOTE - NS ED ATTENDING STATEMENT MOD
Attending Only Neurocritical team made aware of seizure activity, Vimpat ordered. Patient stable at this time. I have personally performed a face to face diagnostic evaluation on this patient. I have reviewed the ACP note and agree with the history, exam and plan of care, except as noted.

## 2023-12-29 ENCOUNTER — APPOINTMENT (OUTPATIENT)
Dept: OBGYN | Facility: CLINIC | Age: 48
End: 2023-12-29

## 2024-03-29 ENCOUNTER — APPOINTMENT (OUTPATIENT)
Dept: OBGYN | Facility: CLINIC | Age: 49
End: 2024-03-29
Payer: MEDICARE

## 2024-03-29 VITALS
DIASTOLIC BLOOD PRESSURE: 85 MMHG | BODY MASS INDEX: 34.82 KG/M2 | HEIGHT: 65 IN | SYSTOLIC BLOOD PRESSURE: 119 MMHG | HEART RATE: 66 BPM | WEIGHT: 209 LBS

## 2024-03-29 DIAGNOSIS — N39.0 URINARY TRACT INFECTION, SITE NOT SPECIFIED: ICD-10-CM

## 2024-03-29 DIAGNOSIS — N76.0 ACUTE VAGINITIS: ICD-10-CM

## 2024-03-29 PROCEDURE — 99214 OFFICE O/P EST MOD 30 MIN: CPT

## 2024-03-29 PROCEDURE — 99459 PELVIC EXAMINATION: CPT

## 2024-03-29 RX ORDER — BLOOD SUGAR DIAGNOSTIC
STRIP MISCELLANEOUS
Qty: 150 | Refills: 2 | Status: DISCONTINUED | COMMUNITY
Start: 2021-05-14 | End: 2024-03-29

## 2024-04-01 LAB
BACTERIA UR CULT: NORMAL
C TRACH RRNA SPEC QL NAA+PROBE: NOT DETECTED
CANDIDA VAG CYTO: NOT DETECTED
G VAGINALIS+PREV SP MTYP VAG QL MICRO: DETECTED
N GONORRHOEA RRNA SPEC QL NAA+PROBE: NOT DETECTED
SOURCE AMPLIFICATION: NORMAL
T VAGINALIS VAG QL WET PREP: NOT DETECTED

## 2024-04-01 NOTE — PLAN
[FreeTextEntry1] :   Vaginal discharge  Affirm done today GC testing done today Urine Culture done today   Acute UTI Rx for Nitrofurantoin Macrocrystal is given  Acute vaginitis Likely BV- Rx for Metronidazole sent

## 2024-04-01 NOTE — HISTORY OF PRESENT ILLNESS
[No] : Patient does not have concerns regarding sex [Men] : men [Currently Active] : currently active [FreeTextEntry1] : 48 year old female presents today for follow up visit. Pt suspects of a possible UTI and yeast infection due to burning with urination, irritation, and increased vaginal discharge. No other gyn complaints.

## 2024-04-01 NOTE — SIGNATURES
[TextEntry] : This note was written by Ana Proctor on 03/29/2024 actively solely ADA Vázquez M.D 03/29/2024. All medical record entries made by this scribe were at my  ADA Vázquez M.D  direction and personally dictated by me on 03/29/2024. I have personally reviewed my chart and agree that the record reflects my personal performance of the history, physical exam, assessment, and plan.

## 2024-04-01 NOTE — PHYSICAL EXAM
[Chaperone Present] : A chaperone was present in the examining room during all aspects of the physical examination [Labia Majora] : normal [Labia Minora] : normal [Discharge] : a  ~M vaginal discharge was present [Normal] : normal [Uterine Adnexae] : normal [FreeTextEntry1] : NEHAL  [Appropriately responsive] : appropriately responsive [Alert] : alert [No Acute Distress] : no acute distress [No Lymphadenopathy] : no lymphadenopathy [No Murmurs] : no murmurs [Regular Rate Rhythm] : regular rate rhythm [Clear to Auscultation B/L] : clear to auscultation bilaterally [Soft] : soft [Non-distended] : non-distended [Non-tender] : non-tender [No HSM] : No HSM [No Lesions] : no lesions [Oriented x3] : oriented x3 [No Mass] : no mass [FreeTextEntry4] : Discharge consistent with BV

## 2024-06-18 ENCOUNTER — LABORATORY RESULT (OUTPATIENT)
Age: 49
End: 2024-06-18

## 2024-06-18 ENCOUNTER — APPOINTMENT (OUTPATIENT)
Dept: OBGYN | Facility: CLINIC | Age: 49
End: 2024-06-18
Payer: MEDICARE

## 2024-06-18 VITALS
HEIGHT: 65 IN | HEART RATE: 66 BPM | DIASTOLIC BLOOD PRESSURE: 83 MMHG | WEIGHT: 182 LBS | BODY MASS INDEX: 30.32 KG/M2 | SYSTOLIC BLOOD PRESSURE: 118 MMHG

## 2024-06-18 DIAGNOSIS — Z01.411 ENCOUNTER FOR GYNECOLOGICAL EXAMINATION (GENERAL) (ROUTINE) WITH ABNORMAL FINDINGS: ICD-10-CM

## 2024-06-18 DIAGNOSIS — R92.30 DENSE BREASTS, UNSPECIFIED: ICD-10-CM

## 2024-06-18 DIAGNOSIS — Z87.42 PERSONAL HISTORY OF OTHER DISEASES OF THE FEMALE GENITAL TRACT: ICD-10-CM

## 2024-06-18 DIAGNOSIS — R30.0 DYSURIA: ICD-10-CM

## 2024-06-18 DIAGNOSIS — N89.8 OTHER SPECIFIED NONINFLAMMATORY DISORDERS OF VAGINA: ICD-10-CM

## 2024-06-18 DIAGNOSIS — Z87.898 PERSONAL HISTORY OF OTHER SPECIFIED CONDITIONS: ICD-10-CM

## 2024-06-18 PROCEDURE — 99396 PREV VISIT EST AGE 40-64: CPT | Mod: 25

## 2024-06-18 PROCEDURE — G0444 DEPRESSION SCREEN ANNUAL: CPT

## 2024-06-18 PROCEDURE — 96127 BRIEF EMOTIONAL/BEHAV ASSMT: CPT

## 2024-06-18 PROCEDURE — 99459 PELVIC EXAMINATION: CPT

## 2024-06-18 PROCEDURE — 99213 OFFICE O/P EST LOW 20 MIN: CPT | Mod: 25

## 2024-06-18 RX ORDER — ONDANSETRON 4 MG/1
4 TABLET ORAL EVERY 8 HOURS
Qty: 20 | Refills: 0 | Status: DISCONTINUED | COMMUNITY
Start: 2021-08-06 | End: 2024-06-18

## 2024-06-18 RX ORDER — BLOOD-GLUCOSE METER
W/DEVICE KIT MISCELLANEOUS
Qty: 1 | Refills: 0 | Status: DISCONTINUED | COMMUNITY
Start: 2021-05-14 | End: 2024-06-18

## 2024-06-18 RX ORDER — METRONIDAZOLE 7.5 MG/G
0.75 GEL VAGINAL
Qty: 1 | Refills: 1 | Status: DISCONTINUED | COMMUNITY
Start: 2024-03-29 | End: 2024-06-18

## 2024-06-18 RX ORDER — NITROFURANTOIN MACROCRYSTALS 100 MG/1
100 CAPSULE ORAL
Qty: 10 | Refills: 0 | Status: DISCONTINUED | COMMUNITY
Start: 2024-03-29 | End: 2024-06-18

## 2024-06-18 RX ORDER — ALCOHOL ANTISEPTIC PADS
PADS, MEDICATED (EA) TOPICAL
Qty: 150 | Refills: 2 | Status: DISCONTINUED | COMMUNITY
Start: 2021-05-14 | End: 2024-06-18

## 2024-06-18 RX ORDER — NIFEDIPINE 30 MG/1
30 TABLET, FILM COATED, EXTENDED RELEASE ORAL DAILY
Qty: 30 | Refills: 2 | Status: DISCONTINUED | COMMUNITY
Start: 2021-11-06 | End: 2024-06-18

## 2024-06-18 RX ORDER — ASPIRIN 81 MG/1
81 TABLET, COATED ORAL
Qty: 30 | Refills: 1 | Status: DISCONTINUED | COMMUNITY
Start: 2021-08-06 | End: 2024-06-18

## 2024-06-19 LAB
APPEARANCE: CLEAR
BACTERIA: NEGATIVE /HPF
BILIRUBIN URINE: NEGATIVE
BLOOD URINE: NEGATIVE
CAST: 0 /LPF
COLOR: YELLOW
EPITHELIAL CELLS: 0 /HPF
GLUCOSE QUALITATIVE U: NEGATIVE MG/DL
KETONES URINE: NEGATIVE MG/DL
LEUKOCYTE ESTERASE URINE: NEGATIVE
MICROSCOPIC-UA: NORMAL
NITRITE URINE: NEGATIVE
PH URINE: 5.5
PROTEIN URINE: NEGATIVE MG/DL
RED BLOOD CELLS URINE: 1 /HPF
SPECIFIC GRAVITY URINE: 1.02
UROBILINOGEN URINE: 0.2 MG/DL
WHITE BLOOD CELLS URINE: 0 /HPF

## 2024-06-20 LAB — BACTERIA UR CULT: NORMAL

## 2024-06-27 NOTE — HISTORY OF PRESENT ILLNESS
[Y] : Patient is sexually active [Monogamous (Male Partner)] : is monogamous with a male partner [FreeTextEntry1] : 49 year old  LMP 5/3/24 presents for annual gyn visit.  She complains of vaginal discomfort. She was in the office in March with a BV infection. Metronidazole helped.      [Mammogramdate] : 2023 [BreastSonogramDate] : 2023 [PapSmeardate] : 5/22 [BoneDensityDate] : 2021 [ColonoscopyDate] : 2023

## 2024-06-27 NOTE — COUNSELING
[Lab Results] : lab results [Nutrition/ Exercise/ Weight Management] : nutrition, exercise, weight management [Body Image] : body image [Vitamins/Supplements] : vitamins/supplements [Sunscreen] : sunscreen [Breast Self Exam] : breast self exam [Contraception/ Emergency Contraception/ Safe Sexual Practices] : contraception, emergency contraception, safe sexual practices [STD (testing, results, tx)] : STD (testing, results, tx) [Medication Management] : medication management [FreeTextEntry2] :  patient screened for depression - no signs of clinical depression. EPDS scores reviewed over the course of the visit. 5-10 minutes of face to face time. Follow up with changes in mood including other symptoms of anxiety.

## 2024-06-27 NOTE — END OF VISIT
[FreeTextEntry3] : I, Aisha Mary, acted as a scribe on behalf of Dr. Kirill Reis on 06/18/2024 .  All medical entries made by the scribe were at my, Dr. Kirill Reis , direction and personally dictated by me on 06/18/2024 .. I have reviewed the chart and agree that the record accurately reflects my personal performance of the history, physical exam, assessment and plan. I have also personally directed, reviewed, and agreed with the chart.

## 2024-06-27 NOTE — PLAN
[FreeTextEntry1] : 49 year old presents for an annual gyn exam.   HCM  HPV/Pap performed today Referral given for mammo/sono RTO 1 year for annual  Acute vaginitis  - vaginitis panel sent Cx sent

## 2024-06-27 NOTE — PHYSICAL EXAM
[Chaperone Present] : A chaperone was present in the examining room during all aspects of the physical examination [Appropriately responsive] : appropriately responsive [Alert] : alert [No Acute Distress] : no acute distress [No Lymphadenopathy] : no lymphadenopathy [Soft] : soft [Non-tender] : non-tender [Non-distended] : non-distended [No HSM] : No HSM [No Lesions] : no lesions [No Mass] : no mass [Oriented x3] : oriented x3 [Examination Of The Breasts] : a normal appearance [No Discharge] : no discharge [No Masses] : no breast masses were palpable [Labia Majora] : normal [Labia Minora] : normal [Normal] : normal [Uterine Adnexae] : normal [FreeTextEntry2] : NEHAL Jain

## 2024-06-28 LAB
CYTOLOGY CVX/VAG DOC THIN PREP: NORMAL
HPV HIGH+LOW RISK DNA PNL CVX: DETECTED

## 2025-03-19 NOTE — CONSULT NOTE ADULT - ASSESSMENT
46y  LMP 21 @16wk w/ UCx confirmed pan-sensitive E. Coli UTI () on Macrobid day 9/10 presenting with flank pain and chills at home x1 day.  Labs wnl, WBC 10.15, UA clean.  Patient afebrile with vital signs wnl.  Physical exam w/ CVA tenderness.  Bedside doppler .  Patient received IV Ceftriaxone and IVF in ED as well as tylenol for pain which provided partial relief.  Accepted by [facility] for observation with concern for pyelonephritis    Recs:  -continue IVF, pain meds and abx per ED protocol  -monitor VS  -renal and OB sono  -GYN will continue to follow    d/w Dr. Chato Mustafa, PGY2 No outpatient medications have been marked as taking for the 3/20/25 encounter (Hospital Encounter).          NPO Instructions:    Do not eat any food after midnight the night before your surgery/procedure.  You may have 13 ounces of clear liquids until TWO hours before surgery/procedure (completed by 0800). This includes water, black tea/coffee, (no milk or cream) apple juice and electrolyte drinks (Gatorade).  You may chew gum up to TWO hours before your surgery/procedure.    Additional Instructions:     Day of Surgery: Arrival in registration at 0830 for a 1000 surgery.     Enter through the main entrance of [facility], located at [address] Proceed to registration, located on the right hand side of the staircase. You will need your ID and insurance card for registration. Please ensure you have a responsible adult to drive you home. A responsible adult DOES NOT include rideshare service drivers ([employer], Lyft, etc), cab drivers, or public transportation drivers, but “provide a ride” is acceptable.    Take a shower before your procedure. After your shower avoid lotions, powders, deodorants or anything applied to the skin. If you wear contacts or glasses, wear the glasses. If you do not have glasses, please bring a case for your contacts. You may wear hearing aids and dentures, bring a case for them or we will provide one. Make sure you wear something loose and comfortable. Keep in mind your surgical procedure and wear something that will accommodate incisions or bandages. Please remove all jewelry and piercing's.     For further questions Sherley NAIR can be contacted at [phone] between 7AM-3PM.                                                   46y  LMP 21 @16wk w/ UCx confirmed pan-sensitive E. Coli UTI () on Macrobid day 9/10 presenting with flank pain and chills at home x1 day.  Labs wnl, WBC 10.15, UA clean.  Patient afebrile with vital signs wnl.  Physical exam w/ CVA tenderness.  Bedside doppler .  Patient received IV Ceftriaxone and IVF in ED as well as tylenol for pain which provided partial relief.  Accepted by [facility] for observation with concern for pyelonephritis.    Recs:  -continue IVF, pain meds and abx per ED protocol  -monitor VS  -f/u Covid  -renal and OB sono  -GYN will continue to follow    d/w Dr. Chato Mustafa, PGY2

## 2025-05-12 DIAGNOSIS — Z15.89 GENETIC SUSCEPTIBILITY TO OTHER DISEASE: ICD-10-CM
